# Patient Record
Sex: FEMALE | Race: WHITE | Employment: PART TIME | URBAN - METROPOLITAN AREA
[De-identification: names, ages, dates, MRNs, and addresses within clinical notes are randomized per-mention and may not be internally consistent; named-entity substitution may affect disease eponyms.]

---
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
## 2024-07-05 ENCOUNTER — HOSPITAL ENCOUNTER (INPATIENT)
Age: 19
LOS: 5 days | Discharge: STILL ADMITTED OR ADMINISTRATIVE | End: 2024-07-10
Attending: PSYCHIATRY & NEUROLOGY | Admitting: PSYCHIATRY & NEUROLOGY

## 2024-07-05 ENCOUNTER — HOSPITAL ENCOUNTER (EMERGENCY)
Age: 19
Discharge: PSYCHIATRIC OR SUBSTANCE USE FACILITY | End: 2024-07-05
Attending: EMERGENCY MEDICINE

## 2024-07-05 VITALS
HEIGHT: 63 IN | WEIGHT: 119.27 LBS | TEMPERATURE: 98.3 F | BODY MASS INDEX: 21.13 KG/M2 | HEART RATE: 80 BPM | RESPIRATION RATE: 16 BRPM | OXYGEN SATURATION: 98 % | DIASTOLIC BLOOD PRESSURE: 68 MMHG | SYSTOLIC BLOOD PRESSURE: 97 MMHG

## 2024-07-05 DIAGNOSIS — F32.A DEPRESSION WITH SUICIDAL IDEATION: Primary | ICD-10-CM

## 2024-07-05 DIAGNOSIS — R45.851 DEPRESSION WITH SUICIDAL IDEATION: Primary | ICD-10-CM

## 2024-07-05 LAB
AMPHETAMINES UR QL SCN>1000 NG/ML: NORMAL
ANION GAP SERPL CALCULATED.3IONS-SCNC: 12 MMOL/L (ref 3–16)
APAP SERPL-MCNC: <5 UG/ML (ref 10–30)
BACTERIA URNS QL MICRO: ABNORMAL /HPF
BARBITURATES UR QL SCN>200 NG/ML: NORMAL
BASOPHILS # BLD: 0 K/UL (ref 0–0.2)
BASOPHILS NFR BLD: 0.3 %
BENZODIAZ UR QL SCN>200 NG/ML: NORMAL
BILIRUB UR QL STRIP.AUTO: NEGATIVE
BUN SERPL-MCNC: 11 MG/DL (ref 7–20)
CALCIUM SERPL-MCNC: 9.4 MG/DL (ref 8.3–10.6)
CANNABINOIDS UR QL SCN>50 NG/ML: NORMAL
CHLORIDE SERPL-SCNC: 108 MMOL/L (ref 99–110)
CLARITY UR: ABNORMAL
CO2 SERPL-SCNC: 23 MMOL/L (ref 21–32)
COCAINE UR QL SCN: NORMAL
COLOR UR: ABNORMAL
CREAT SERPL-MCNC: 0.5 MG/DL (ref 0.6–1.1)
DEPRECATED RDW RBC AUTO: 13.2 % (ref 12.4–15.4)
DRUG SCREEN COMMENT UR-IMP: NORMAL
EOSINOPHIL # BLD: 0.1 K/UL (ref 0–0.6)
EOSINOPHIL NFR BLD: 1.4 %
EPI CELLS #/AREA URNS AUTO: 3 /HPF (ref 0–5)
ETHANOLAMINE SERPL-MCNC: NORMAL MG/DL (ref 0–0.08)
FENTANYL SCREEN, URINE: NORMAL
GFR SERPLBLD CREATININE-BSD FMLA CKD-EPI: >90 ML/MIN/{1.73_M2}
GLUCOSE SERPL-MCNC: 87 MG/DL (ref 70–99)
GLUCOSE UR STRIP.AUTO-MCNC: NEGATIVE MG/DL
HCG UR QL: NEGATIVE
HCT VFR BLD AUTO: 40.8 % (ref 36–48)
HGB BLD-MCNC: 14 G/DL (ref 12–16)
HGB UR QL STRIP.AUTO: ABNORMAL
HYALINE CASTS #/AREA URNS AUTO: 0 /LPF (ref 0–8)
KETONES UR STRIP.AUTO-MCNC: ABNORMAL MG/DL
LEUKOCYTE ESTERASE UR QL STRIP.AUTO: ABNORMAL
LYMPHOCYTES # BLD: 2.2 K/UL (ref 1–5.1)
LYMPHOCYTES NFR BLD: 39.9 %
MCH RBC QN AUTO: 30.4 PG (ref 26–34)
MCHC RBC AUTO-ENTMCNC: 34.4 G/DL (ref 31–36)
MCV RBC AUTO: 88.4 FL (ref 80–100)
METHADONE UR QL SCN>300 NG/ML: NORMAL
MONOCYTES # BLD: 0.6 K/UL (ref 0–1.3)
MONOCYTES NFR BLD: 10.2 %
NEUTROPHILS # BLD: 2.6 K/UL (ref 1.7–7.7)
NEUTROPHILS NFR BLD: 48.2 %
NITRITE UR QL STRIP.AUTO: NEGATIVE
OPIATES UR QL SCN>300 NG/ML: NORMAL
OXYCODONE UR QL SCN: NORMAL
PCP UR QL SCN>25 NG/ML: NORMAL
PH UR STRIP.AUTO: 6.5 [PH] (ref 5–8)
PH UR STRIP: 6.5 [PH]
PLATELET # BLD AUTO: 240 K/UL (ref 135–450)
PMV BLD AUTO: 9.1 FL (ref 5–10.5)
POTASSIUM SERPL-SCNC: 4 MMOL/L (ref 3.5–5.1)
PROT UR STRIP.AUTO-MCNC: ABNORMAL MG/DL
RBC # BLD AUTO: 4.61 M/UL (ref 4–5.2)
RBC CLUMPS #/AREA URNS AUTO: 892 /HPF (ref 0–4)
SALICYLATES SERPL-MCNC: <0.3 MG/DL (ref 15–30)
SARS-COV-2 RDRP RESP QL NAA+PROBE: NOT DETECTED
SODIUM SERPL-SCNC: 143 MMOL/L (ref 136–145)
SP GR UR STRIP.AUTO: 1.02 (ref 1–1.03)
UA COMPLETE W REFLEX CULTURE PNL UR: ABNORMAL
UA DIPSTICK W REFLEX MICRO PNL UR: YES
URN SPEC COLLECT METH UR: ABNORMAL
UROBILINOGEN UR STRIP-ACNC: 1 E.U./DL
WBC # BLD AUTO: 5.5 K/UL (ref 4–11)
WBC #/AREA URNS AUTO: 2 /HPF (ref 0–5)

## 2024-07-05 PROCEDURE — 80143 DRUG ASSAY ACETAMINOPHEN: CPT

## 2024-07-05 PROCEDURE — 84703 CHORIONIC GONADOTROPIN ASSAY: CPT

## 2024-07-05 PROCEDURE — 87635 SARS-COV-2 COVID-19 AMP PRB: CPT

## 2024-07-05 PROCEDURE — 85025 COMPLETE CBC W/AUTO DIFF WBC: CPT

## 2024-07-05 PROCEDURE — 1240000000 HC EMOTIONAL WELLNESS R&B

## 2024-07-05 PROCEDURE — 80048 BASIC METABOLIC PNL TOTAL CA: CPT

## 2024-07-05 PROCEDURE — 80179 DRUG ASSAY SALICYLATE: CPT

## 2024-07-05 PROCEDURE — 81001 URINALYSIS AUTO W/SCOPE: CPT

## 2024-07-05 PROCEDURE — 80307 DRUG TEST PRSMV CHEM ANLYZR: CPT

## 2024-07-05 PROCEDURE — 99285 EMERGENCY DEPT VISIT HI MDM: CPT

## 2024-07-05 PROCEDURE — 82077 ASSAY SPEC XCP UR&BREATH IA: CPT

## 2024-07-05 RX ORDER — HYDROXYZINE 50 MG/1
50 TABLET, FILM COATED ORAL 3 TIMES DAILY PRN
Status: DISCONTINUED | OUTPATIENT
Start: 2024-07-05 | End: 2024-07-10 | Stop reason: HOSPADM

## 2024-07-05 RX ORDER — POLYETHYLENE GLYCOL 3350 17 G
2 POWDER IN PACKET (EA) ORAL
Status: DISCONTINUED | OUTPATIENT
Start: 2024-07-05 | End: 2024-07-10 | Stop reason: HOSPADM

## 2024-07-05 RX ORDER — OLANZAPINE 5 MG/1
5 TABLET ORAL 2 TIMES DAILY PRN
Status: DISCONTINUED | OUTPATIENT
Start: 2024-07-05 | End: 2024-07-06

## 2024-07-05 RX ORDER — TRAZODONE HYDROCHLORIDE 50 MG/1
50 TABLET ORAL NIGHTLY PRN
Status: DISCONTINUED | OUTPATIENT
Start: 2024-07-05 | End: 2024-07-10 | Stop reason: HOSPADM

## 2024-07-05 RX ORDER — ACETAMINOPHEN 325 MG/1
650 TABLET ORAL EVERY 8 HOURS PRN
Status: DISCONTINUED | OUTPATIENT
Start: 2024-07-05 | End: 2024-07-10 | Stop reason: HOSPADM

## 2024-07-05 ASSESSMENT — ENCOUNTER SYMPTOMS
VOMITING: 0
SHORTNESS OF BREATH: 0
ABDOMINAL PAIN: 0
SORE THROAT: 0
COUGH: 0
NAUSEA: 0
CHEST TIGHTNESS: 0
BACK PAIN: 0
VOICE CHANGE: 0
WHEEZING: 0

## 2024-07-05 ASSESSMENT — PATIENT HEALTH QUESTIONNAIRE - PHQ9
7. TROUBLE CONCENTRATING ON THINGS, SUCH AS READING THE NEWSPAPER OR WATCHING TELEVISION: NEARLY EVERY DAY
8. MOVING OR SPEAKING SO SLOWLY THAT OTHER PEOPLE COULD HAVE NOTICED. OR THE OPPOSITE, BEING SO FIGETY OR RESTLESS THAT YOU HAVE BEEN MOVING AROUND A LOT MORE THAN USUAL: NEARLY EVERY DAY
4. FEELING TIRED OR HAVING LITTLE ENERGY: NEARLY EVERY DAY
SUM OF ALL RESPONSES TO PHQ QUESTIONS 1-9: 27
1. LITTLE INTEREST OR PLEASURE IN DOING THINGS: NEARLY EVERY DAY
3. TROUBLE FALLING OR STAYING ASLEEP: NEARLY EVERY DAY
SUM OF ALL RESPONSES TO PHQ QUESTIONS 1-9: 24
5. POOR APPETITE OR OVEREATING: NEARLY EVERY DAY
2. FEELING DOWN, DEPRESSED OR HOPELESS: NEARLY EVERY DAY
SUM OF ALL RESPONSES TO PHQ QUESTIONS 1-9: 27
10. IF YOU CHECKED OFF ANY PROBLEMS, HOW DIFFICULT HAVE THESE PROBLEMS MADE IT FOR YOU TO DO YOUR WORK, TAKE CARE OF THINGS AT HOME, OR GET ALONG WITH OTHER PEOPLE: EXTREMELY DIFFICULT
SUM OF ALL RESPONSES TO PHQ QUESTIONS 1-9: 27
SUM OF ALL RESPONSES TO PHQ9 QUESTIONS 1 & 2: 6
9. THOUGHTS THAT YOU WOULD BE BETTER OFF DEAD, OR OF HURTING YOURSELF: NEARLY EVERY DAY
6. FEELING BAD ABOUT YOURSELF - OR THAT YOU ARE A FAILURE OR HAVE LET YOURSELF OR YOUR FAMILY DOWN: NEARLY EVERY DAY

## 2024-07-05 ASSESSMENT — LIFESTYLE VARIABLES
HOW MANY STANDARD DRINKS CONTAINING ALCOHOL DO YOU HAVE ON A TYPICAL DAY: PATIENT DOES NOT DRINK
HOW OFTEN DO YOU HAVE A DRINK CONTAINING ALCOHOL: NEVER
HOW MANY STANDARD DRINKS CONTAINING ALCOHOL DO YOU HAVE ON A TYPICAL DAY: PATIENT DOES NOT DRINK
HOW OFTEN DO YOU HAVE A DRINK CONTAINING ALCOHOL: NEVER

## 2024-07-05 ASSESSMENT — PAIN DESCRIPTION - LOCATION: LOCATION: HEAD

## 2024-07-05 ASSESSMENT — PAIN SCALES - GENERAL
PAINLEVEL_OUTOF10: 3
PAINLEVEL_OUTOF10: 0

## 2024-07-05 ASSESSMENT — SLEEP AND FATIGUE QUESTIONNAIRES
AVERAGE NUMBER OF SLEEP HOURS: 4
DO YOU HAVE DIFFICULTY SLEEPING: YES
DO YOU USE A SLEEP AID: NO
SLEEP PATTERN: DIFFICULTY FALLING ASLEEP;DISTURBED/INTERRUPTED SLEEP;NIGHTMARES/TERRORS

## 2024-07-05 ASSESSMENT — PAIN DESCRIPTION - PAIN TYPE: TYPE: ACUTE PAIN

## 2024-07-05 ASSESSMENT — PAIN - FUNCTIONAL ASSESSMENT
PAIN_FUNCTIONAL_ASSESSMENT: ACTIVITIES ARE NOT PREVENTED
PAIN_FUNCTIONAL_ASSESSMENT: 0-10

## 2024-07-05 ASSESSMENT — PAIN DESCRIPTION - DESCRIPTORS: DESCRIPTORS: ACHING

## 2024-07-05 NOTE — PROGRESS NOTES
4 Eyes Skin Assessment     NAME:  Alisson Lee  YOB: 2005  MEDICAL RECORD NUMBER:  [id number]    The patient is being assessed for  Admission    I agree that at least one RN has performed a thorough Head to Toe Skin Assessment on the patient. ALL assessment sites listed below have been assessed.      Areas assessed by both nurses:    Head, Face, Ears, Shoulders, Back, Chest, Arms, Elbows, Hands, Sacrum. Buttock, Coccyx, Ischium, and Legs. Feet and Heels        Pt had scattered BUE superficial abrasions noted- dry  no drainage noted    Does the Patient have a Wound? No noted wound(s)       Tigre Prevention initiated by RN: No  Wound Care Orders initiated by RN: No    Pressure Injury (Stage 3,4, Unstageable, DTI, NWPT, and Complex wounds) if present, place Wound referral order by RN under : No    New Ostomies, if present place, Ostomy referral order under : No     Nurse 1 eSignature: {Esignature:[id number]}    **SHARE this note so that the co-signing nurse can place an eSignature**    Nurse 2 eSignature: Electronically signed by Nj Capone RN on 7/5/24 at 6:02 PM EDT

## 2024-07-05 NOTE — PROGRESS NOTES
CSSR-S Assessment completed with patient who then scored HIGH RISK.     Provider Dr. Cho was notified of HIGH RISK score, via Telephone at 1712.      Were suicide precautions ordered: YES, per protocol then d/c    If not ordered, justification as follows: Pt is calm and cooperative denies current SI plan or intentions and agrees to communicate with staff if no longer feel safe or in control.       Completed by: Teresa Alvarado RN

## 2024-07-05 NOTE — ED PROVIDER NOTES
[facility] EMERGENCY DEPARTMENT  EMERGENCY DEPARTMENT ENCOUNTER        Pt Name: Alisson Lee  MRN: [id number]  Birthdate 2005  Date of evaluation: 7/5/2024  Provider: ESTEBAN Puckett - EVY  PCP: Alice Robert MD  Note Started: 12:32 PM EDT 7/5/24       I have seen and evaluated this patient with my supervising physician Samia Alvarez MD.      CHIEF COMPLAINT       Chief Complaint   Patient presents with    Suicidal     Feelings of wanting to hurt herself.  Has cut in the past and thoughts of dong it now.  Is currently not on meds due to side effects.  Has been admitted in the past to [facility], Does not want to go there but will go somewhere else for treatment.         HISTORY OF PRESENT ILLNESS: 1 or more Elements     History From: Patient  Limitations to history : None    Alisson Lee is a 19 y.o. female who presents to the emergency department today reporting worsening depression and suicidal thoughts.  Patient states that she has a history of cutting and states that she used a kitchen knife this morning to Both arms.  Patient is currently not on any psychiatric medications.  Patient has been admitted to inpatient psych in the past, states that she is agreeable to admission but does not want to go to [facility].  Patient denies excessive alcohol consumption.  She denies illicit drug use.  She denies visual or auditory hallucinations.  Patient states that she has trouble sleeping.  She denies overdosing.  Patient states \"I just do not want to live anymore\".    Nursing Notes were all reviewed and agreed with or any disagreements were addressed in the HPI.    REVIEW OF SYSTEMS :      Review of Systems   Constitutional:  Positive for appetite change. Negative for chills, fatigue and fever.   HENT:  Negative for congestion, sore throat and voice change.    Eyes:  Negative for visual disturbance.   Respiratory:  Negative for cough, chest tightness, shortness  depression and suicidal thoughts.  Patient states that she has a history of cutting and states that she used a kitchen knife this morning to Both arms.  Patient is currently not on any psychiatric medications.  Patient has been admitted to inpatient psych in the past, states that she is agreeable to admission but does not want to go to [facility].  Patient denies excessive alcohol consumption.  She denies illicit drug use.  She denies visual or auditory hallucinations.  Patient states that she has trouble sleeping.  She denies overdosing.  Patient states \"I just do not want to live anymore\".  Physical exam complete.  Patient arrives nontoxic, afebrile, normotensive.  No signs or symptoms of acute distress noted.    Patient placed on psychiatric hold.  Laboratory studies pending.  Patient awaiting telepsych consult.    Laboratory studies have been unremarkable.  No significant electrolyte derangement or VINNIE.  No leukocytosis.  No significant anemia.  Drug screen is negative.  EtOH level is negative.  Salicylate and acetaminophen levels are also negative.    Inpatient psychiatric admission recommended per telepsych .    At this time there is no evidence of any life-threatening or emergent conditions requiring immediate intervention.    Patient is medically cleared and awaiting inpatient psych placement.  Patient is in agreement with this plan of care.    This case was discussed with ED attending Dr. Alvarez who also performed face-to-face evaluation and is in agreement with this plan of care.      I am the Primary Clinician of Record.  FINAL IMPRESSION      1. Depression with suicidal ideation          DISPOSITION/PLAN     DISPOSITION [facility] 07/05/2024 12:00:51 PM      PATIENT REFERRED TO:  No follow-up provider specified.    DISCHARGE MEDICATIONS:  New Prescriptions    No medications on file       DISCONTINUED MEDICATIONS:  Discontinued Medications    No medications on file

## 2024-07-05 NOTE — ED TRIAGE NOTES
Feelings of wanting to hurt herself.  Has cut in the past and thoughts of dong it now.  Is currently not on meds due to side effects.  Has been admitted in the past to [facility], Does not want to go there but will go somewhere else for treatment.

## 2024-07-05 NOTE — ED PROVIDER NOTES
ED Attending Attestation Note    This patient was seen by the advanced practice provider.     I personally saw the patient. Management plan and care decisions have been discussed with me and I made/approved the management plan and take responsibility for patient management.     Briefly, 19 y.o. female presents with history of depression, ADHD, anxiety, prior admission for suicidal ideation presenting with increasing depression symptoms and 3 days of suicidal ideation.  Does not have definitive plan yet.  Does not have access to firearms.  Denies ingestion or substance issues.  Denies hallucinations or homicidal ideation.  Has several cuts on her forearms    Focused exam:   Gen: 19 y.o. female, NAD, nontoxic appearing  HEENT: NCAT. MMM, PERRL. EOMI.   CV: RRR w/o MRG  Vascular: intact and symmetric radial and DP pulses bilaterally  Lungs: CTAB. No incr WOB.   Abdomen: Soft, nontender, nondistended. No rebound/guarding.   Neuro: awake and alert, speech clear w/o aphasia; intact and symmetric strength and sensation in all 4 extremities, CN 2-12 intact bilaterally  Skin: Several superficial cuts on bilateral forearms  Psych: Flat affect, guarded    MDM:   This is a 19-year-old female with history of depression and anxiety presenting with suicidal ideation.  She does not have any active medical concerns.  She is appropriate for psychiatry admission at this time.  Wounds will be cleaned and tetanus will be updated.  No need for laceration repair.  UDS, salicylate ethanol, acetaminophen CBC, BMP unremarkable.  COVID-negative, hCG negative.    Telepsych consulted and inpatient psychiatry admission recommended.    Nursing notes, vital signs reviewed.     For further details of the patient's emergency department visit, please see the advanced practice provider's documentation.    Samia Alvarez MD     This report has been produced using speech recognition software and may contain errors related to that system including

## 2024-07-05 NOTE — ED NOTES
Registration called this RN stating brothkim is at their desk, requesting to come back. This RN asked patient if brother Kvng was allowed to visit, patient stated no. Registration informed. Charge RN John sharif.

## 2024-07-05 NOTE — PROGRESS NOTES
4 Eyes Skin Assessment     NAME:  Alisson Lee  YOB: 2005  MEDICAL RECORD NUMBER:  [id number]    The patient is being assessed for  Admission    I agree that at least one RN has performed a thorough Head to Toe Skin Assessment on the patient. ALL assessment sites listed below have been assessed.      Areas assessed by both nurses:    Head, Face, Ears, Shoulders, Back, Chest, Arms, Elbows, Hands, Sacrum. Buttock, Coccyx, Ischium, and Legs. Feet and Heels  PT. Noted to have multiple lacerations to BUE. No drainage, no bandaging needed.        Does the Patient have a Wound? No noted wound(s)       Tigre Prevention initiated by RN: No  Wound Care Orders initiated by RN: No    Pressure Injury (Stage 3,4, Unstageable, DTI, NWPT, and Complex wounds) if present, place Wound referral order by RN under : No    New Ostomies, if present place, Ostomy referral order under : No     Nurse 1 eSignature: Electronically signed by Teresa Alvarado RN on 7/5/24 at 6:03 PM EDT    **SHARE this note so that the co-signing nurse can place an eSignature**    Nurse 2 eSignature: Electronically signed by Nj Capone RN on 7/5/24 at 6:04 PM EDT
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
## 2024-07-05 NOTE — ED NOTES
Patient removed all clothes, shoes, keys, pants, socks ,flannel shirt and phone placed in bag and placed in locker #1. Patient placed in gown. Hunter EDT now at bedside for constant observer.

## 2024-07-05 NOTE — PROGRESS NOTES
Pt arrived on unit at 1645 she is calm cooperative. Passes through security check. 1:1 initiated for suicide precautions. Snack and drink provided. Pt denies current SI and agrees to communicate with staff if no longer feel safe or in control. V.s.s.

## 2024-07-06 PROBLEM — F33.2 SEVERE EPISODE OF RECURRENT MAJOR DEPRESSIVE DISORDER, WITHOUT PSYCHOTIC FEATURES (HCC): Status: ACTIVE | Noted: 2024-07-05

## 2024-07-06 PROCEDURE — 99221 1ST HOSP IP/OBS SF/LOW 40: CPT

## 2024-07-06 PROCEDURE — 6370000000 HC RX 637 (ALT 250 FOR IP): Performed by: PSYCHIATRY & NEUROLOGY

## 2024-07-06 PROCEDURE — 1240000000 HC EMOTIONAL WELLNESS R&B

## 2024-07-06 RX ORDER — ESCITALOPRAM OXALATE 10 MG/1
5 TABLET ORAL DAILY
Status: COMPLETED | OUTPATIENT
Start: 2024-07-06 | End: 2024-07-06

## 2024-07-06 RX ORDER — ESCITALOPRAM OXALATE 10 MG/1
10 TABLET ORAL DAILY
Status: DISCONTINUED | OUTPATIENT
Start: 2024-07-07 | End: 2024-07-10 | Stop reason: HOSPADM

## 2024-07-06 RX ADMIN — ESCITALOPRAM OXALATE 5 MG: 10 TABLET ORAL at 16:45

## 2024-07-06 RX ADMIN — TRAZODONE HYDROCHLORIDE 50 MG: 50 TABLET ORAL at 20:54

## 2024-07-06 RX ADMIN — ACETAMINOPHEN 650 MG: 325 TABLET ORAL at 16:45

## 2024-07-06 ASSESSMENT — PATIENT HEALTH QUESTIONNAIRE - PHQ9
SUM OF ALL RESPONSES TO PHQ9 QUESTIONS 1 & 2: 6
8. MOVING OR SPEAKING SO SLOWLY THAT OTHER PEOPLE COULD HAVE NOTICED. OR THE OPPOSITE, BEING SO FIGETY OR RESTLESS THAT YOU HAVE BEEN MOVING AROUND A LOT MORE THAN USUAL: NEARLY EVERY DAY
2. FEELING DOWN, DEPRESSED OR HOPELESS: NEARLY EVERY DAY
SUM OF ALL RESPONSES TO PHQ QUESTIONS 1-9: 27
9. THOUGHTS THAT YOU WOULD BE BETTER OFF DEAD, OR OF HURTING YOURSELF: NEARLY EVERY DAY
3. TROUBLE FALLING OR STAYING ASLEEP: NEARLY EVERY DAY
7. TROUBLE CONCENTRATING ON THINGS, SUCH AS READING THE NEWSPAPER OR WATCHING TELEVISION: NEARLY EVERY DAY
1. LITTLE INTEREST OR PLEASURE IN DOING THINGS: NEARLY EVERY DAY
5. POOR APPETITE OR OVEREATING: NEARLY EVERY DAY
SUM OF ALL RESPONSES TO PHQ QUESTIONS 1-9: 27
4. FEELING TIRED OR HAVING LITTLE ENERGY: NEARLY EVERY DAY
10. IF YOU CHECKED OFF ANY PROBLEMS, HOW DIFFICULT HAVE THESE PROBLEMS MADE IT FOR YOU TO DO YOUR WORK, TAKE CARE OF THINGS AT HOME, OR GET ALONG WITH OTHER PEOPLE: EXTREMELY DIFFICULT
SUM OF ALL RESPONSES TO PHQ QUESTIONS 1-9: 24
6. FEELING BAD ABOUT YOURSELF - OR THAT YOU ARE A FAILURE OR HAVE LET YOURSELF OR YOUR FAMILY DOWN: NEARLY EVERY DAY
SUM OF ALL RESPONSES TO PHQ QUESTIONS 1-9: 27

## 2024-07-06 ASSESSMENT — LIFESTYLE VARIABLES
HOW MANY STANDARD DRINKS CONTAINING ALCOHOL DO YOU HAVE ON A TYPICAL DAY: PATIENT DOES NOT DRINK
HOW OFTEN DO YOU HAVE A DRINK CONTAINING ALCOHOL: NEVER

## 2024-07-06 ASSESSMENT — SLEEP AND FATIGUE QUESTIONNAIRES
SLEEP PATTERN: DIFFICULTY FALLING ASLEEP;NIGHTMARES/TERRORS;INSOMNIA
AVERAGE NUMBER OF SLEEP HOURS: 4
DO YOU USE A SLEEP AID: NO
DO YOU HAVE DIFFICULTY SLEEPING: YES

## 2024-07-06 ASSESSMENT — PAIN SCALES - GENERAL: PAINLEVEL_OUTOF10: 0

## 2024-07-06 ASSESSMENT — PAIN DESCRIPTION - LOCATION: LOCATION: HEAD

## 2024-07-06 NOTE — H&P
Hospital Medicine History & Physical      PCP: Alice Robert MD    Date of Admission: 7/5/2024    Date of Service: Pt seen/examined on 7/6/2024     Chief Complaint:  No chief complaint on file.        History Of Present Illness:      The patient is a 19 y.o. female with pmhx of migraines, depression who presented to [facility] for worsening depression and suicidal ideation.  Patient was seen and evaluated in the ED by the ED medical provider, patient was medically cleared for admission to [facility] at [facility].  This note serves as an admission medical H&P.    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 No pertinent surgical history.    Medications Prior to Admission:    Prior to Admission medications    Medication Sig Start Date End Date Taking? Authorizing Provider   amitriptyline (ELAVIL) 25 MG tablet Take 37.5 mg by mouth nightly  Patient not taking: Reported on 7/5/2024    Prosper Sanchez MD   FLUoxetine HCl (PROZAC PO) Take by mouth  Patient not taking: Reported on 7/5/2024    Prosper Sanchez MD   ondansetron (ZOFRAN ODT) 4 MG disintegrating tablet Take 1 tablet by mouth every 8 hours as needed for Nausea  Patient not taking: Reported on 7/5/2024 6/3/17   Gibran Tee DO       Allergies:  Patient has no known allergies.    Social History:      TOBACCO:   reports that she has never smoked. She has been exposed to tobacco smoke. She has never used smokeless tobacco.  ETOH:   reports no history of alcohol use.      Family History:   Positive as follows:    History reviewed. No pertinent family history.    REVIEW OF SYSTEMS:       Constitutional: Negative for fever   HENT: Negative for sore throat   Eyes: Negative

## 2024-07-06 NOTE — PLAN OF CARE
Alisson has been withdrawn to her room this shift. Patient denies current SI/HI and A/VH. Alisson presents a flat affect and sad mood. Patient reports she feels safe here and will communicate with staff if she feels unsafe or has any thoughts to self harm.   Problem: Depression  Goal: Will be euthymic at discharge  Description: INTERVENTIONS:  1. Administer medication as ordered  2. Provide emotional support via 1:1 interaction with staff  3. Encourage involvement in milieu/groups/activities  4. Monitor for social isolation  Outcome: Progressing     Problem: Self Harm/Suicidality  Goal: Will have no self-injury during hospital stay  Description: INTERVENTIONS:  1.  Ensure constant observer at bedside with Q15M safety checks  2.  Maintain a safe environment  3.  Secure patient belongings  4.  Ensure family/visitors adhere to safety recommendations  5.  Ensure safety tray has been added to patient's diet order  6.  Every shift and PRN: Re-assess suicidal risk via Frequent Screener    7/5/2024 2048 by Lizzie Duque RN  Outcome: Progressing  Flowsheets (Taken 7/5/2024 1719 by Teresa Alvarado RN)  Will have no self-injury during hospital stay:   Ensure constant observer at bedside with Q15M safety checks   Maintain a safe environment
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
## 2024-07-06 NOTE — GROUP NOTE
Group Therapy Note    Date: 7/6/2024    Group Start Time: 1320  Group End Time: 1400  Group Topic: Cognitive Skills    [facility]    Tammy Cruz LISW        Group Therapy Note    Attendees: 2       Patient's Goal:  Patient will understand the difference between reacting vs. Responding and how this affects symptoms of anxiety.     Notes:  patients were provided with information about responding vs reacting to stressful situations and how to utilize mindfulness skills to respond in a calmer manner.    Status After Intervention:  Improved    Participation Level: Active Listener and Minimal    Participation Quality: Attentive      Speech:  hesitant      Thought Process/Content: Linear      Affective Functioning: Congruent      Mood: anxious and depressed      Level of consciousness:  Alert, Oriented x4, and Attentive      Response to Learning: Able to verbalize current knowledge/experience and Progressing to goal      Endings: None Reported    Modes of Intervention: Education      Discipline Responsible: /Counselor      Signature:  SANDRA Puente

## 2024-07-06 NOTE — H&P
[address] [address]                           HISTORY & PHYSICAL      PATIENT NAME: NICOLETTE FARAH            : 2005  MED REC NO: [id number]                      ROOM: [id number]  ACCOUNT NO: [id number]                       ADMIT DATE: 2024  PROVIDER: Severiano Cho MD      REFERRING PHYSICIAN:  CHERRIE LAI    IDENTIFICATION:  This is a domiciled, never , and employed 19-year-old with a self-reported history of depression and ADD, whose friend brought her to [facility] with worsening symptoms of depression and thoughts of suicide.    SOURCES OF INFORMATION:  Patient.  Focused record review.    CHIEF COMPLAINT:  \"I was going to attempt suicide.\"    HISTORY OF PRESENT ILLNESS:  The patient reports she last felt okay about 3 months ago.  Since then, she has developed depression.  She describes worsening low mood, anhedonia, fatigue, poor concentration, amotivation, insomnia, decreased appetite, and 3-4 weeks of suicidal ideation.  She began thinking of ways she might do it but did not carry it out because of friends.  She is agnostic about the afterlife.    She reports feeling safe here and is interested in treatment.    She presents depressed and slowed.  She does not make eye contact.    PSYCHIATRIC REVIEW OF SYSTEMS:  No judah or psychosis.    STRESSORS:  \"I don't know.\"    PAST PSYCHIATRIC HISTORY:  One previous hospitalization - [facility] about 1 year ago.  No other hospitalizations.  She reports no suicide attempts, but does have a history of self-harm behavior in the form of cutting.  She has worked with a therapist for the last year and a half.  She has been diagnosed with depression and ADD.  Previous medication trials include Prozac, Strattera, Adderall, Elavil, Wellbutrin, and others she cannot remember.  She does not remember any being particularly helpful.    SUBSTANCE  here and willing to approach staff with concerns.    Her ability for abstract thought was limited based on her interpretation of simple proverbs.    Insight and judgment were impaired.    PHYSICAL EXAM:  VITAL SIGNS:  Temperature 97.7, pulse 72, respiratory rate 16, blood pressure 114/63.  GAIT:  Normal.    LABORATORY DATA:  Shows a CMP was within normal limits.  Ethanol level not detectable.  Urine drug screen negative.  Acetaminophen and salicylate levels below threshold.  CBC within normal limits.  UA shows possible infection that she may be on her period.    FORMULATION:  This is a domiciled, never , and employed 19-year-old with history of depression and ADD, whose friend brought her to [facility] Emergency Department with worsening symptoms of depression and thoughts of suicide.  She meets criteria for major depressive episode.  She is at risk and requires inpatient stabilization.    DIAGNOSIS:  Major depressive disorder, recurrent, severe, without psychotic features.    PLAN:    1. Admit to Psychiatry for further evaluation and stabilization.  2. Discussed treatment options at length and agreed to start Lexapro.  Start 5 mg today and increase to 10 mg tomorrow.  Order q.15-minute checks for safety, programming, and p.r.n. medication for anxiety, agitation, insomnia.  3. Hospitalist consult for admission.  4. Collateral information if available for diagnostic clarification and care coordination.  5. Estimated length of stay 5-8 days.  She was transferred on a Statement of Belief but agrees to stay voluntarily.    75 minutes was spent with the patient completing this evaluation and more than 50% of time was spent completing this evaluation, providing counseling, and planning treatment with the patient.          LAMONTE TIWARI MD      D:  07/06/2024 10:48:17     T:  07/06/2024 13:19:33     SHEELA/ROMI  Job #:  [id number]     Doc#:  [id number]

## 2024-07-06 NOTE — PLAN OF CARE
Problem: Self Harm/Suicidality  Goal: Will have no self-injury during hospital stay  Description: INTERVENTIONS:  1.  Ensure constant observer at bedside with Q15M safety checks  2.  Maintain a safe environment  3.  Secure patient belongings  4.  Ensure family/visitors adhere to safety recommendations  5.  Ensure safety tray has been added to patient's diet order  6.  Every shift and PRN: Re-assess suicidal risk via Frequent Screener    Outcome: Progressing     Problem: Depression  Goal: Will be euthymic at discharge  Description: INTERVENTIONS:  1. Administer medication as ordered  2. Provide emotional support via 1:1 interaction with staff  3. Encourage involvement in milieu/groups/activities  4. Monitor for social isolation  Outcome: Progressing     Problem: Behavior  Goal: Pt/Family maintain appropriate behavior and adhere to behavioral management agreement, if implemented  Description: INTERVENTIONS:  1. Assess patient/family's coping skills and  non-compliant behavior (including use of illegal substances)  2. Notify security of behavior or suspected illegal substances which indicate the need for search of the family and/or belongings  3. Encourage verbalization of thoughts and concerns in a socially appropriate manner  4. Utilize positive, consistent limit setting strategies supporting safety of patient, staff and others  5. Encourage participation in the decision making process about the behavioral management agreement  6. If a visitor's behavior poses a threat to safety call refer to organization policy.  7. Initiate consult with , Psychosocial CNS, Spiritual Care as appropriate  Outcome: Progressing     Problem: Anxiety  Goal: Will report anxiety at manageable levels  Description: INTERVENTIONS:  1. Administer medication as ordered  2. Teach and rehearse alternative coping skills  3. Provide emotional support with 1:1 interaction with staff  Outcome: Progressing  Flowsheets (Taken 7/6/2024

## 2024-07-06 NOTE — PROGRESS NOTES
Behavioral Services  Medicare Certification Upon Admission    I certify that this patient's inpatient psychiatric hospital admission is medically necessary for:    [x] (1) Treatment which could reasonably be expected to improve this patient's condition,       [x] (2) Or for diagnostic study;     AND     [x](2) The inpatient psychiatric services are provided while the individual is under the care of a physician and are included in the individualized plan of care.    Estimated length of stay/service 5-8 days    Plan for post-hospital care incomplete     Electronically signed by LAMONTE TIWARI MD on 7/6/2024 at 10:49 AM

## 2024-07-07 PROCEDURE — 6370000000 HC RX 637 (ALT 250 FOR IP): Performed by: PSYCHIATRY & NEUROLOGY

## 2024-07-07 PROCEDURE — 1240000000 HC EMOTIONAL WELLNESS R&B

## 2024-07-07 RX ADMIN — ESCITALOPRAM OXALATE 10 MG: 10 TABLET ORAL at 08:53

## 2024-07-07 RX ADMIN — TRAZODONE HYDROCHLORIDE 50 MG: 50 TABLET ORAL at 20:54

## 2024-07-07 ASSESSMENT — PAIN SCALES - GENERAL: PAINLEVEL_OUTOF10: 0

## 2024-07-07 NOTE — PLAN OF CARE
Problem: Self Harm/Suicidality  Goal: Will have no self-injury during hospital stay  Description: INTERVENTIONS:  1.  Ensure constant observer at bedside with Q15M safety checks  2.  Maintain a safe environment  3.  Secure patient belongings  4.  Ensure family/visitors adhere to safety recommendations  5.  Ensure safety tray has been added to patient's diet order  6.  Every shift and PRN: Re-assess suicidal risk via Frequent Screener    7/6/2024 2337 by Lizzie Duque, RN  Outcome: Progressing     Problem: Depression  Goal: Will be euthymic at discharge  Description: INTERVENTIONS:  1. Administer medication as ordered  2. Provide emotional support via 1:1 interaction with staff  3. Encourage involvement in milieu/groups/activities  4. Monitor for social isolation  7/6/2024 2337 by Lizzie Duque, RN  Outcome: Progressing   Alisson has been withdrawn to room this shift. Patient sat at her window and watched the sunset. She stated \"I watch the sunset every night.\" Patient denies current SI/HI and A/VH. Patient presents a flat affect but brightens slightly with interaction. PRN Trazodone requested/given for sleep. Patient requested and was given a journal. Declined snack when offered.

## 2024-07-07 NOTE — PLAN OF CARE
Problem: Self Harm/Suicidality  Goal: Will have no self-injury during hospital stay  Description: INTERVENTIONS:  1.  Ensure constant observer at bedside with Q15M safety checks  2.  Maintain a safe environment  3.  Secure patient belongings  4.  Ensure family/visitors adhere to safety recommendations  5.  Ensure safety tray has been added to patient's diet order  6.  Every shift and PRN: Re-assess suicidal risk via Frequent Screener    Recent Flowsheet Documentation  Taken 7/7/2024 1157 by Ida Lino RN  Will have no self-injury during hospital stay: Maintain a safe environment  7/6/2024 2337 by Lizzie Duque RN  Outcome: Progressing     Problem: Depression  Goal: Will be euthymic at discharge  Description: INTERVENTIONS:  1. Administer medication as ordered  2. Provide emotional support via 1:1 interaction with staff  3. Encourage involvement in milieu/groups/activities  4. Monitor for social isolation  7/6/2024 2337 by Lizzie Duque RN  Outcome: Progressing     Problem: Anxiety  Goal: Will report anxiety at manageable levels  Description: INTERVENTIONS:  1. Administer medication as ordered  2. Teach and rehearse alternative coping skills  3. Provide emotional support with 1:1 interaction with staff  Recent Flowsheet Documentation  Taken 7/7/2024 1157 by Ida Lino RN  Will report anxiety at manageable levels:   Administer medication as ordered   Teach and rehearse alternative coping skills   Provide emotional support with 1:1 interaction with staff

## 2024-07-08 PROCEDURE — 6370000000 HC RX 637 (ALT 250 FOR IP): Performed by: PSYCHIATRY & NEUROLOGY

## 2024-07-08 PROCEDURE — 1240000000 HC EMOTIONAL WELLNESS R&B

## 2024-07-08 RX ADMIN — TRAZODONE HYDROCHLORIDE 50 MG: 50 TABLET ORAL at 21:15

## 2024-07-08 RX ADMIN — ACETAMINOPHEN 650 MG: 325 TABLET ORAL at 11:16

## 2024-07-08 RX ADMIN — HYDROXYZINE HYDROCHLORIDE 50 MG: 50 TABLET, FILM COATED ORAL at 17:48

## 2024-07-08 RX ADMIN — ESCITALOPRAM OXALATE 10 MG: 10 TABLET ORAL at 08:37

## 2024-07-08 ASSESSMENT — PAIN SCALES - GENERAL
PAINLEVEL_OUTOF10: 0
PAINLEVEL_OUTOF10: 6

## 2024-07-08 ASSESSMENT — PAIN DESCRIPTION - LOCATION: LOCATION: HEAD

## 2024-07-08 NOTE — PROGRESS NOTES
Pt isolative to room.  Pt did not wish to speak to this writer throughout shift, selectively mute.  Pt did assure staff that she was safe and did not have thoughts of harming herself.      Staff received a phone call from a woman, Pal.  When the patient was informed of the call, she hunched down in the corner of her room with her face covered.  She declined any needs at this time.  This writer sat with patient; however, she declined to talk.  Pt came to team station about an hour later and received PRN Atarax for anxiety.  She also grabbed her dinner and took it to her room.  She appeared to be eating most of her food.      Much emotional support provided to patient throughout shift. Monitored closely for safety and comfort.

## 2024-07-08 NOTE — PLAN OF CARE
Problem: Self Harm/Suicidality  Goal: Will have no self-injury during hospital stay  Description: INTERVENTIONS:  1.  Ensure constant observer at bedside with Q15M safety checks  2.  Maintain a safe environment  3.  Secure patient belongings  4.  Ensure family/visitors adhere to safety recommendations  5.  Ensure safety tray has been added to patient's diet order  6.  Every shift and PRN: Re-assess suicidal risk via Frequent Screener    Outcome: Progressing     Problem: Depression  Goal: Will be euthymic at discharge  Description: INTERVENTIONS:  1. Administer medication as ordered  2. Provide emotional support via 1:1 interaction with staff  3. Encourage involvement in milieu/groups/activities  4. Monitor for social isolation  Outcome: Progressing     Problem: Behavior  Goal: Pt/Family maintain appropriate behavior and adhere to behavioral management agreement, if implemented  Description: INTERVENTIONS:  1. Assess patient/family's coping skills and  non-compliant behavior (including use of illegal substances)  2. Notify security of behavior or suspected illegal substances which indicate the need for search of the family and/or belongings  3. Encourage verbalization of thoughts and concerns in a socially appropriate manner  4. Utilize positive, consistent limit setting strategies supporting safety of patient, staff and others  5. Encourage participation in the decision making process about the behavioral management agreement  6. If a visitor's behavior poses a threat to safety call refer to organization policy.  7. Initiate consult with , Psychosocial CNS, Spiritual Care as appropriate  Outcome: Progressing     Problem: Anxiety  Goal: Will report anxiety at manageable levels  Description: INTERVENTIONS:  1. Administer medication as ordered  2. Teach and rehearse alternative coping skills  3. Provide emotional support with 1:1 interaction with staff  Outcome: Progressing     Problem: Sleep  Disturbance  Goal: Will exhibit normal sleeping pattern  Description: INTERVENTIONS:  1. Administer medication as ordered  2. Decrease environmental stimuli, including noise, as appropriate  3. Discourage social isolation and naps during the day  Outcome: Progressing   Pt brighter tonight. She is more talkative. She laughed and joked a bit. Pt pleasant and cooperative.  Talking about her journaling that she uses as a coping mechanism. Encouraged groups to develop further coping mechanism. Pt voiced understanding and said that she will start going to groups. Encouraged her to follow her schedule which she had out on her desk.  Discussed the brother she lives with and parents. Pt doesn't seem very close to parents. She doesn't get to see her brother a lot either due to their differing schedules.   Pt seems, overall less anxious.  Pt given Trazodone 50 mg at 2054 which was effective.

## 2024-07-08 NOTE — GROUP NOTE
Group Therapy Note    Date: 7/8/2024    Group Start Time: 1000  Group End Time: 1045  Group Topic: Cognitive Skills    [facility]    Phuong Joyner LPC    Participants engaged in exploring somatic symptoms of anxiety and depression. Participants discussed the \"alphabet of coping skills\" and identified numerous coping skills.     Group Therapy Note    Attendees: 9         Notes:  Alisson was observed to be an active listener throughout the group. She was observed to share one coping skill with the group but was seen to be taking notes and engaged.     Status After Intervention:  Improved    Participation Level: Active Listener and Interactive    Participation Quality: Appropriate and Sharing      Speech:  normal      Thought Process/Content: Logical      Affective Functioning: Congruent      Mood: anxious      Level of consciousness:  Alert and Oriented x4      Response to Learning: Able to verbalize current knowledge/experience      Endings: None Reported    Modes of Intervention: Education, Support, Socialization, and Exploration      Discipline Responsible: /Counselor      Signature:  Phuong Joyner LPC

## 2024-07-08 NOTE — PLAN OF CARE
Problem: Depression  Goal: Will be euthymic at discharge  Description: INTERVENTIONS:  1. Administer medication as ordered  2. Provide emotional support via 1:1 interaction with staff  3. Encourage involvement in milieu/groups/activities  4. Monitor for social isolation  7/8/2024 0923 by Ever Rivas RN (Peters)  Outcome: Not Progressing     Problem: Self Harm/Suicidality  Goal: Will have no self-injury during hospital stay  Description: INTERVENTIONS:  1.  Ensure constant observer at bedside with Q15M safety checks  2.  Maintain a safe environment  3.  Secure patient belongings  4.  Ensure family/visitors adhere to safety recommendations  5.  Ensure safety tray has been added to patient's diet order  6.  Every shift and PRN: Re-assess suicidal risk via Frequent Screener    7/8/2024 0923 by Ever Rivas RN (Peters)  Outcome: Progressing     Problem: Behavior  Goal: Pt/Family maintain appropriate behavior and adhere to behavioral management agreement, if implemented  Description: INTERVENTIONS:  1. Assess patient/family's coping skills and  non-compliant behavior (including use of illegal substances)  2. Notify security of behavior or suspected illegal substances which indicate the need for search of the family and/or belongings  3. Encourage verbalization of thoughts and concerns in a socially appropriate manner  4. Utilize positive, consistent limit setting strategies supporting safety of patient, staff and others  5. Encourage participation in the decision making process about the behavioral management agreement  6. If a visitor's behavior poses a threat to safety call refer to organization policy.  7. Initiate consult with , Psychosocial CNS, Spiritual Care as appropriate  7/8/2024 0923 by Ever Rivas (Peters) RN  Outcome: Progressing

## 2024-07-09 PROCEDURE — 1240000000 HC EMOTIONAL WELLNESS R&B

## 2024-07-09 PROCEDURE — 6370000000 HC RX 637 (ALT 250 FOR IP): Performed by: PSYCHIATRY & NEUROLOGY

## 2024-07-09 RX ADMIN — ESCITALOPRAM OXALATE 10 MG: 10 TABLET ORAL at 12:00

## 2024-07-09 RX ADMIN — TRAZODONE HYDROCHLORIDE 50 MG: 50 TABLET ORAL at 21:32

## 2024-07-09 NOTE — PLAN OF CARE
Problem: Self Harm/Suicidality  Goal: Will have no self-injury during hospital stay  Description: INTERVENTIONS:  1.  Ensure constant observer at bedside with Q15M safety checks  2.  Maintain a safe environment  3.  Secure patient belongings  4.  Ensure family/visitors adhere to safety recommendations  5.  Ensure safety tray has been added to patient's diet order  6.  Every shift and PRN: Re-assess suicidal risk via Frequent Screener    Outcome: Progressing     Problem: Depression  Goal: Will be euthymic at discharge  Description: INTERVENTIONS:  1. Administer medication as ordered  2. Provide emotional support via 1:1 interaction with staff  3. Encourage involvement in milieu/groups/activities  4. Monitor for social isolation  Outcome: Progressing     Problem: Anxiety  Goal: Will report anxiety at manageable levels  Description: INTERVENTIONS:  1. Administer medication as ordered  2. Teach and rehearse alternative coping skills  3. Provide emotional support with 1:1 interaction with staff  Outcome: Progressing      Patient is in the lateral left side position.

## 2024-07-09 NOTE — GROUP NOTE
Group Therapy Note    Date: 7/9/2024    Group Start Time: 1000  Group End Time: 1045  Group Topic: Psychoeducation    [facility]    oDra Marin LISW        Group Therapy Note    Attendees: 7       Patient's Goal:  to learn and discuss the communication styles of being assertive, passive and aggressive and that communicating in an assertive manner is the healthiest way to communicate. Pt's asked to apply to themselves.                                                                    Notes:  pt attended group for the full duration. She participated in group discussion when called upon and was able to apply to herself.    Status After Intervention:  Improved    Participation Level: Active Listener and Minimal    Participation Quality: Appropriate and Attentive      Speech:  hesitant      Thought Process/Content: Logical      Affective Functioning: Congruent      Mood: depressed      Level of consciousness:  Alert, Oriented x4, and Attentive      Response to Learning: Able to verbalize current knowledge/experience, Able to verbalize/acknowledge new learning, and Progressing to goal      Endings: None Reported    Modes of Intervention: Education, Support, Socialization, Exploration, and Clarifying      Discipline Responsible: /Counselor      Signature:  SANDRA Haq

## 2024-07-09 NOTE — PROGRESS NOTES
Pt has been visible on unit at times but withdrawn to self. Currently in room doing puzzle. She reports feeling better overall but still struggling with anxiety that comes and goes and depression/sadness stating they have not improved. Unable to identify a trigger just states she is overwhelmed with life. She does have a therapist she sees weekly and coping skills in place such as journaling, watching TV. Pt denies current SI/HI/VH/AH and agrees to communicate with staff if no longer feel safe or in control. States she will not do anything here but still feels unsafe at home with thoughts of cutting self. Lives with brother and states she does most the chores and pays bills.

## 2024-07-09 NOTE — BH NOTE
Patient alert and oriented x 3. Patient rates Depression 9/10 and Anxiety 6/10. Patient seclusive to her room this evening but did come to wrap up group and participated. Patient denies SI/HI/A/V/H. Patient doesn't have HS medications scheduled. Patient states, \"Anxiety medication helped rates 2/10.\" Patient requesting a sleeoing pill. Patient medicated with Trazodone 50 mg po.Patient denies self harm, but stated if she leaves she will try kill herself by cutting self  with a knife\"

## 2024-07-09 NOTE — PROGRESS NOTES
Department of Psychiatry  Progress Note    Patient's chart was reviewed. Discussed with treatment team. Met with patient.     SUBJECTIVE:      Remains depressed.    No EC; limited engagement; flat affect.    Attending groups and journaling.     Tolerating Lexapro well so far.     ROS:   Patient has new complaints: no  Sleeping adequately:  Yes   Appetite adequate: Yes  Engaged in programming: yes    OBJECTIVE:  VITALS:  /60   Pulse 85   Temp 98.4 °F (36.9 °C) (Oral)   Resp 16   Ht 1.6 m (5' 2.99\")   Wt 54.1 kg (119 lb 4.3 oz)   LMP 07/01/2024 Comment: Currently menstrating  SpO2 98%   BMI 21.13 kg/m²     Mental Status Examination:    Appearance: fair grooming and hygiene  Behavior/Attitude toward examiner:  guarded  Speech: poverty  Mood:  \"I don't know\"  Affect:  flat  Thought processes:  Goal directed, linear, no MARISABEL or gross disorganization  Thought Content: less SI, no HI, no delusions voiced, no obsessions  Perceptions: no AVH  Attention: attention span and concentration were intact to interview   Abstraction: intact  Cognition:  Alert and oriented to person, place, time, and situation, recall intact  Insight: Limited   Judgment: Limited    Medication:  Scheduled:   escitalopram  10 mg Oral Daily        PRN:  acetaminophen, hydrOXYzine HCl, traZODone, nicotine polacrilex     FORMULATION:  This is a domiciled, never , and employed 19-year-old with history of depression and ADD, whose friend brought her to [facility] with worsening symptoms of depression and thoughts of suicide.  She meets criteria for major depressive episode.  She is at risk and requires inpatient stabilization.     Principal Problem:    Severe episode of recurrent major depressive disorder, without psychotic features (HCC)  Resolved Problems:    * No resolved hospital problems. *     PLAN:    1. Admitted to Psychiatry for further evaluation and stabilization.    2. On admission, discussed treatment

## 2024-07-09 NOTE — GROUP NOTE
Group Therapy Note    Date: 7/9/2024    Group Start Time: 1100  Group End Time: 1145  Group Topic: Healthy Living/Wellness    [facility]    Mray Kate Ghosh, RT        Group Therapy Note    Attendees: 8    Wellness group was centered on the importance of personal maintenance and having healthy outlets for stress.  Group discussed how self care and leisure fit into recovery and relapse prevention.      Notes:  Pt was present and attentive across session. Shared input during group discussion and said that spending time with friends helps when feeling down.  Pt interacted appropriately with others and met goal for group.     Status After Intervention:  Improved    Participation Level: Active Listener, Interactive, and Minimal    Participation Quality: Appropriate, Attentive, and Sharing      Speech:  hesitant      Thought Process/Content: Logical      Affective Functioning: Congruent      Mood: depressed      Level of consciousness:  Alert and Attentive      Response to Learning: Able to verbalize current knowledge/experience and Progressing to goal      Endings: None Reported    Modes of Intervention: Education, Support, Socialization, Exploration, Clarifying, and Problem-solving      Discipline Responsible: Psychoeducational Specialist and Recreational Therapist      Signature:  Mary Kate Ghosh MA, CTRS

## 2024-07-10 ENCOUNTER — APPOINTMENT (OUTPATIENT)
Dept: CT IMAGING | Age: 19
End: 2024-07-10
Attending: PSYCHIATRY & NEUROLOGY

## 2024-07-10 ENCOUNTER — HOSPITAL ENCOUNTER (INPATIENT)
Age: 19
LOS: 2 days | Discharge: PSYCHIATRIC OR SUBSTANCE USE FACILITY | End: 2024-07-12
Attending: INTERNAL MEDICINE | Admitting: INTERNAL MEDICINE

## 2024-07-10 VITALS
RESPIRATION RATE: 16 BRPM | DIASTOLIC BLOOD PRESSURE: 45 MMHG | WEIGHT: 119.27 LBS | HEIGHT: 63 IN | HEART RATE: 80 BPM | BODY MASS INDEX: 21.13 KG/M2 | TEMPERATURE: 97.5 F | SYSTOLIC BLOOD PRESSURE: 98 MMHG | OXYGEN SATURATION: 100 %

## 2024-07-10 PROBLEM — F48.8 PSYCHOGENIC SYNCOPE: Status: ACTIVE | Noted: 2024-07-10

## 2024-07-10 PROBLEM — R55 SYNCOPE AND COLLAPSE: Status: ACTIVE | Noted: 2024-07-10

## 2024-07-10 PROBLEM — I95.1 ORTHOSTATIC HYPOTENSION: Status: ACTIVE | Noted: 2024-07-10

## 2024-07-10 LAB
ALBUMIN SERPL-MCNC: 4.1 G/DL (ref 3.4–5)
ALBUMIN/GLOB SERPL: 1.6 {RATIO} (ref 1.1–2.2)
ALP SERPL-CCNC: 64 U/L (ref 40–129)
ALT SERPL-CCNC: 12 U/L (ref 10–40)
ANION GAP SERPL CALCULATED.3IONS-SCNC: 11 MMOL/L (ref 3–16)
AST SERPL-CCNC: 17 U/L (ref 15–37)
BASOPHILS # BLD: 0 K/UL (ref 0–0.2)
BASOPHILS NFR BLD: 0.5 %
BILIRUB SERPL-MCNC: 0.4 MG/DL (ref 0–1)
BUN SERPL-MCNC: 11 MG/DL (ref 7–20)
CALCIUM SERPL-MCNC: 9 MG/DL (ref 8.3–10.6)
CHLORIDE SERPL-SCNC: 104 MMOL/L (ref 99–110)
CO2 SERPL-SCNC: 23 MMOL/L (ref 21–32)
CREAT SERPL-MCNC: 0.6 MG/DL (ref 0.6–1.1)
DEPRECATED RDW RBC AUTO: 13.1 % (ref 12.4–15.4)
EKG ATRIAL RATE: 86 BPM
EKG DIAGNOSIS: NORMAL
EKG P AXIS: 79 DEGREES
EKG P-R INTERVAL: 186 MS
EKG Q-T INTERVAL: 382 MS
EKG QRS DURATION: 76 MS
EKG QTC CALCULATION (BAZETT): 457 MS
EKG R AXIS: 67 DEGREES
EKG T AXIS: 55 DEGREES
EKG VENTRICULAR RATE: 86 BPM
EOSINOPHIL # BLD: 0.2 K/UL (ref 0–0.6)
EOSINOPHIL NFR BLD: 2.7 %
GFR SERPLBLD CREATININE-BSD FMLA CKD-EPI: >90 ML/MIN/{1.73_M2}
GLUCOSE BLD-MCNC: 73 MG/DL (ref 70–99)
GLUCOSE BLD-MCNC: 73 MG/DL (ref 70–99)
GLUCOSE SERPL-MCNC: 89 MG/DL (ref 70–99)
HCT VFR BLD AUTO: 40.4 % (ref 36–48)
HGB BLD-MCNC: 13.4 G/DL (ref 12–16)
LYMPHOCYTES # BLD: 2.5 K/UL (ref 1–5.1)
LYMPHOCYTES NFR BLD: 40.1 %
MCH RBC QN AUTO: 29.8 PG (ref 26–34)
MCHC RBC AUTO-ENTMCNC: 33.2 G/DL (ref 31–36)
MCV RBC AUTO: 89.8 FL (ref 80–100)
MONOCYTES # BLD: 0.5 K/UL (ref 0–1.3)
MONOCYTES NFR BLD: 7.4 %
NEUTROPHILS # BLD: 3 K/UL (ref 1.7–7.7)
NEUTROPHILS NFR BLD: 49.3 %
PERFORMED ON: NORMAL
PERFORMED ON: NORMAL
PLATELET # BLD AUTO: 303 K/UL (ref 135–450)
PMV BLD AUTO: 8.5 FL (ref 5–10.5)
POTASSIUM SERPL-SCNC: 3.7 MMOL/L (ref 3.5–5.1)
PROT SERPL-MCNC: 6.6 G/DL (ref 6.4–8.2)
RBC # BLD AUTO: 4.5 M/UL (ref 4–5.2)
SODIUM SERPL-SCNC: 138 MMOL/L (ref 136–145)
TROPONIN, HIGH SENSITIVITY: <6 NG/L (ref 0–14)
WBC # BLD AUTO: 6.2 K/UL (ref 4–11)

## 2024-07-10 PROCEDURE — 2580000003 HC RX 258: Performed by: NURSE PRACTITIONER

## 2024-07-10 PROCEDURE — 80053 COMPREHEN METABOLIC PANEL: CPT

## 2024-07-10 PROCEDURE — 84484 ASSAY OF TROPONIN QUANT: CPT

## 2024-07-10 PROCEDURE — 6360000002 HC RX W HCPCS: Performed by: INTERNAL MEDICINE

## 2024-07-10 PROCEDURE — 93005 ELECTROCARDIOGRAM TRACING: CPT | Performed by: NURSE PRACTITIONER

## 2024-07-10 PROCEDURE — 6370000000 HC RX 637 (ALT 250 FOR IP): Performed by: PSYCHIATRY & NEUROLOGY

## 2024-07-10 PROCEDURE — 6370000000 HC RX 637 (ALT 250 FOR IP): Performed by: NURSE PRACTITIONER

## 2024-07-10 PROCEDURE — 85025 COMPLETE CBC W/AUTO DIFF WBC: CPT

## 2024-07-10 PROCEDURE — 36415 COLL VENOUS BLD VENIPUNCTURE: CPT

## 2024-07-10 PROCEDURE — 70450 CT HEAD/BRAIN W/O DYE: CPT

## 2024-07-10 PROCEDURE — 99223 1ST HOSP IP/OBS HIGH 75: CPT | Performed by: NURSE PRACTITIONER

## 2024-07-10 PROCEDURE — 2060000000 HC ICU INTERMEDIATE R&B

## 2024-07-10 PROCEDURE — 93010 ELECTROCARDIOGRAM REPORT: CPT | Performed by: INTERNAL MEDICINE

## 2024-07-10 RX ORDER — POTASSIUM CHLORIDE 750 MG/1
40 TABLET, EXTENDED RELEASE ORAL PRN
Status: DISCONTINUED | OUTPATIENT
Start: 2024-07-10 | End: 2024-07-12 | Stop reason: HOSPADM

## 2024-07-10 RX ORDER — SODIUM CHLORIDE 9 MG/ML
INJECTION, SOLUTION INTRAVENOUS CONTINUOUS
Status: DISCONTINUED | OUTPATIENT
Start: 2024-07-10 | End: 2024-07-11

## 2024-07-10 RX ORDER — HYDROXYZINE 50 MG/1
50 TABLET, FILM COATED ORAL 3 TIMES DAILY PRN
Status: DISCONTINUED | OUTPATIENT
Start: 2024-07-10 | End: 2024-07-12 | Stop reason: HOSPADM

## 2024-07-10 RX ORDER — SODIUM CHLORIDE 0.9 % (FLUSH) 0.9 %
5-40 SYRINGE (ML) INJECTION PRN
Status: DISCONTINUED | OUTPATIENT
Start: 2024-07-10 | End: 2024-07-12 | Stop reason: HOSPADM

## 2024-07-10 RX ORDER — SODIUM CHLORIDE 0.9 % (FLUSH) 0.9 %
5-40 SYRINGE (ML) INJECTION EVERY 12 HOURS SCHEDULED
Status: DISCONTINUED | OUTPATIENT
Start: 2024-07-10 | End: 2024-07-12 | Stop reason: HOSPADM

## 2024-07-10 RX ORDER — ESCITALOPRAM OXALATE 10 MG/1
10 TABLET ORAL DAILY
Status: CANCELLED | OUTPATIENT
Start: 2024-07-11

## 2024-07-10 RX ORDER — KETOROLAC TROMETHAMINE 30 MG/ML
30 INJECTION, SOLUTION INTRAMUSCULAR; INTRAVENOUS ONCE
Status: COMPLETED | OUTPATIENT
Start: 2024-07-10 | End: 2024-07-10

## 2024-07-10 RX ORDER — ACETAMINOPHEN 650 MG/1
650 SUPPOSITORY RECTAL EVERY 6 HOURS PRN
Status: DISCONTINUED | OUTPATIENT
Start: 2024-07-10 | End: 2024-07-10 | Stop reason: DRUGHIGH

## 2024-07-10 RX ORDER — ACETAMINOPHEN 325 MG/1
650 TABLET ORAL EVERY 6 HOURS PRN
Status: DISCONTINUED | OUTPATIENT
Start: 2024-07-10 | End: 2024-07-10 | Stop reason: DRUGHIGH

## 2024-07-10 RX ORDER — ENOXAPARIN SODIUM 100 MG/ML
40 INJECTION SUBCUTANEOUS DAILY
Status: DISCONTINUED | OUTPATIENT
Start: 2024-07-10 | End: 2024-07-12 | Stop reason: HOSPADM

## 2024-07-10 RX ORDER — ACETAMINOPHEN 325 MG/1
650 TABLET ORAL EVERY 8 HOURS PRN
Status: DISCONTINUED | OUTPATIENT
Start: 2024-07-10 | End: 2024-07-12 | Stop reason: HOSPADM

## 2024-07-10 RX ORDER — POLYETHYLENE GLYCOL 3350 17 G/17G
17 POWDER, FOR SOLUTION ORAL DAILY PRN
Status: DISCONTINUED | OUTPATIENT
Start: 2024-07-10 | End: 2024-07-12 | Stop reason: HOSPADM

## 2024-07-10 RX ORDER — SODIUM CHLORIDE 9 MG/ML
INJECTION, SOLUTION INTRAVENOUS PRN
Status: DISCONTINUED | OUTPATIENT
Start: 2024-07-10 | End: 2024-07-12 | Stop reason: HOSPADM

## 2024-07-10 RX ORDER — MAGNESIUM SULFATE IN WATER 40 MG/ML
2000 INJECTION, SOLUTION INTRAVENOUS PRN
Status: DISCONTINUED | OUTPATIENT
Start: 2024-07-10 | End: 2024-07-12 | Stop reason: HOSPADM

## 2024-07-10 RX ORDER — ONDANSETRON 2 MG/ML
4 INJECTION INTRAMUSCULAR; INTRAVENOUS EVERY 6 HOURS PRN
Status: DISCONTINUED | OUTPATIENT
Start: 2024-07-10 | End: 2024-07-12 | Stop reason: HOSPADM

## 2024-07-10 RX ORDER — POTASSIUM CHLORIDE 7.45 MG/ML
10 INJECTION INTRAVENOUS PRN
Status: DISCONTINUED | OUTPATIENT
Start: 2024-07-10 | End: 2024-07-12 | Stop reason: HOSPADM

## 2024-07-10 RX ORDER — ONDANSETRON 4 MG/1
4 TABLET, ORALLY DISINTEGRATING ORAL EVERY 8 HOURS PRN
Status: DISCONTINUED | OUTPATIENT
Start: 2024-07-10 | End: 2024-07-12 | Stop reason: HOSPADM

## 2024-07-10 RX ADMIN — KETOROLAC TROMETHAMINE 30 MG: 30 INJECTION, SOLUTION INTRAMUSCULAR; INTRAVENOUS at 20:53

## 2024-07-10 RX ADMIN — Medication 10 ML: at 20:22

## 2024-07-10 RX ADMIN — SODIUM CHLORIDE: 9 INJECTION, SOLUTION INTRAVENOUS at 16:27

## 2024-07-10 RX ADMIN — ESCITALOPRAM OXALATE 10 MG: 10 TABLET ORAL at 11:04

## 2024-07-10 RX ADMIN — ACETAMINOPHEN 650 MG: 325 TABLET ORAL at 14:16

## 2024-07-10 ASSESSMENT — PAIN DESCRIPTION - FREQUENCY: FREQUENCY: CONTINUOUS

## 2024-07-10 ASSESSMENT — PAIN DESCRIPTION - DESCRIPTORS
DESCRIPTORS: ACHING
DESCRIPTORS: ACHING

## 2024-07-10 ASSESSMENT — PAIN - FUNCTIONAL ASSESSMENT
PAIN_FUNCTIONAL_ASSESSMENT: ACTIVITIES ARE NOT PREVENTED
PAIN_FUNCTIONAL_ASSESSMENT: ACTIVITIES ARE NOT PREVENTED

## 2024-07-10 ASSESSMENT — PAIN SCALES - GENERAL
PAINLEVEL_OUTOF10: 8
PAINLEVEL_OUTOF10: 8
PAINLEVEL_OUTOF10: 5
PAINLEVEL_OUTOF10: 8

## 2024-07-10 ASSESSMENT — PAIN DESCRIPTION - LOCATION
LOCATION: HEAD
LOCATION: HEAD

## 2024-07-10 ASSESSMENT — PAIN DESCRIPTION - ORIENTATION
ORIENTATION: MID
ORIENTATION: MID

## 2024-07-10 ASSESSMENT — PAIN DESCRIPTION - ONSET: ONSET: PROGRESSIVE

## 2024-07-10 ASSESSMENT — PAIN DESCRIPTION - PAIN TYPE: TYPE: ACUTE PAIN

## 2024-07-10 NOTE — PROGRESS NOTES
Pt has been withdrawn to room and isolative to self but is attending groups. She had been writing a lot in her journal. She is A&OX4 calm cooperative and pleasant. Pt denies current SI/HI/VH/AH and agrees to communicate with staff if no longer feel safe or in control. She states she feels safe here but still unsure how she will feel at home. She read pages out of her journal to me and states she is not sad but she is depressed and has no motivation to do things, is zoned out, de - energized. Discussed coping skills and mediation and need for multiple aspects of care to help her improve. Pt does brighten some during interaction. She reports improved sleep here with the prn trazodone and improved appetite.

## 2024-07-10 NOTE — H&P
hours as needed for Nausea  Patient not taking: Reported on 7/5/2024 6/3/17   Gibran Tee,        Allergies:  Patient has no known allergies.    Social History:  The patient currently lives home    TOBACCO:   reports that she has never smoked. She has been exposed to tobacco smoke. She has never used smokeless tobacco.  ETOH:   reports no history of alcohol use.      Family History:   Positive as follows:    No family history on file.    REVIEW OF SYSTEMS:       Constitutional: Negative for fever   HENT: Negative for sore throat   Eyes: Negative for redness   Respiratory: Negative  for dyspnea, cough   Cardiovascular: Negative for chest pain   Gastrointestinal: Negative for vomiting, diarrhea   Genitourinary: Negative for hematuria   Musculoskeletal: Negative for arthralgias   Skin: Negative for rash   Neurological: +syncope   Hematological: Negative for adenopathy   Psychiatric/Behavorial: + for anxiety    PHYSICAL EXAM:    /65   Pulse 75   Temp 98.7 °F (37.1 °C) (Oral)   Resp 15   Ht 1.6 m (5' 2.99\")   Wt 54.1 kg (119 lb 4.3 oz)   LMP 07/01/2024 Comment: ended 7/8  SpO2 100%   BMI 21.13 kg/m²     Gen: No distress. Alert. Pale, diaphoretic   Eyes: PERRL. No sclera icterus. No conjunctival injection.   ENT: No discharge. Pharynx clear.   Neck: No JVD.  Trachea midline.  Resp: No accessory muscle use. No crackles. No wheezes. No rhonchi.   CV: Regular rate. Regular rhythm. No murmur.  No rub. No edema.   GI: Non-tender. Non-distended. No masses. No organomegaly. Normal bowel sounds. No hernia.   Skin: Warm and dry. No nodule on exposed extremities. No rash on exposed extremities.   M/S: No cyanosis. No joint deformity. No clubbing.   Neuro: Awake. Grossly nonfocal    ROM to all extremities neurovascularly intact  +generalized weakness  ROM to neck without any pain or discomfort  Psych: Oriented x 3. +anxious     CBC:   Recent Labs     07/10/24  1326   WBC 6.2   HGB 13.4   HCT 40.4   MCV 89.8  enlargementBorderline ECGWhen compared with ECG of 08-OCT-2022 15:29,T wave inversion no longer evident in Inferior leads      RADIOLOGY    No orders to display      CT 7/10/2024  FINDINGS:  BRAIN/VENTRICLES: Mild motion/streak artifact is present on the examination.  The ventricular system is normal in appearance.  No evidence of mass effect  or midline shift.  No significant area of abnormal attenuation of brain  parenchyma is identified.  No abnormal extra-axial fluid collection is  identified.     ORBITS: The visualized portion of the orbits demonstrate no acute abnormality.     SINUSES: The visualized paranasal sinuses and mastoid air cells demonstrate  no acute abnormality.  Frontal sinuses are not aerated on a developmental  basis.  4 mm rounded density in the anterior left maxillary sinus could  represent tiny mucous retention cyst or polyp.  Very minimal left maxillary  mucosal thickening is identified.     SOFT TISSUES/SKULL:  No acute abnormality of the visualized skull or soft  tissues.     IMPRESSION:  No evidence of acute intracranial abnormality.     This result has not been signed. Information might be incomplete.    Active Problems:    Psychogenic syncope    Syncope and collapse    Orthostatic hypotension  Resolved Problems:    * No resolved hospital problems. *        ASSESSMENT/PLAN:    Syncope and collapse  Orthostatic hypotension   Functional neurological symptom disorder   Psychogenic syncope   - has followed with neurology in the past  - +orthostatic blood pressure post syncopal episode from laying and sitting--unable to stand  - hx of psychogenic syncope  - neurology consulted  - check orthostatic blood pressure every shift  - start IVF   - check troponin  - check EKG  - monitor on telemetry      Depression  SI  - sitter at bedside  - psychiatry consulted         Note above makes patient higher risk for morbidity and mortality requiring testing and treatment.      DVT Prophylaxis: Lovenox

## 2024-07-10 NOTE — PROGRESS NOTES
Patient admitted to room 3014 from [facility]. Patient oriented to room, call light, bed rails, phone, lights and bathroom. Patient instructed about the schedule of the day including: vital sign frequency, lab draws, possible tests, frequency of MD and staff rounds, daily weights, I &O's and prescribed diet.  Bed locked, in lowest position, side rails up 2/4, call light within reach.        Recliner Assessment  Patient is able to demonstrate the ability to move from a reclining position to an upright position within the recliner.       4 Eyes Skin Assessment     NAME:  Alsison Lee  YOB: 2005  MEDICAL RECORD NUMBER:  [id number]    The patient is being assessed for  Admission    I agree that at least one RN has performed a thorough Head to Toe Skin Assessment on the patient. ALL assessment sites listed below have been assessed.      Areas assessed by both nurses:    Head, Face, Ears, Shoulders, Back, Chest, Arms, Elbows, Hands, Sacrum. Buttock, Coccyx, Ischium, and Legs. Feet and Heels        Does the Patient have a Wound? No noted wound(s)       Tigre Prevention initiated by RN: No  Wound Care Orders initiated by RN: No    Pressure Injury (Stage 3,4, Unstageable, DTI, NWPT, and Complex wounds) if present, place Wound referral order by RN under : No    New Ostomies, if present place, Ostomy referral order under : No     Nurse 1 eSignature: Electronically signed by Anna Babcock RN on 7/10/24 at 3:26 PM EDT    **SHARE this note so that the co-signing nurse can place an eSignature**    Nurse 2 eSignature: {Esignature:[id number]}

## 2024-07-10 NOTE — TRANSITION OF CARE
Behavioral Health Transition Record    Patient Name: Alisson Lee  YOB: 2005   Medical Record Number: [id number]  Date of Admission: 7/5/2024  4:49 PM   Date of Discharge: 7/10/2024    Attending Provider: No att. providers found   Discharging Provider: Severiano Cho MD  To contact this individual call [phone] and ask the  to page.  If unavailable, ask to be transferred to Behavioral Health Provider on call.  A Behavioral Health Provider will be available on call 24/7 and during holidays.    Primary Care Provider: Alice Robert MD    No Known Allergies    Reason for Admission: Alisson Lee is a 19 y.o. female who presents to the emergency department today reporting worsening depression and suicidal thoughts.  Patient states that she has a history of cutting and states that she used a kitchen knife this morning to Both arms.  Patient is currently not on any psychiatric medications.  Patient has been admitted to inpatient psych in the past, states that she is agreeable to admission but does not want to go to [facility].  Patient denies excessive alcohol consumption.  She denies illicit drug use.  She denies visual or auditory hallucinations.  Patient states that she has trouble sleeping.  She denies overdosing.  Patient states \"I just do not want to live anymore\".     Admission Diagnosis: Depression, unspecified [F32.A]    * No surgery found *    Results for orders placed or performed during the hospital encounter of 07/05/24   POCT Glucose   Result Value Ref Range    POC Glucose 73 70 - 99 mg/dl    Performed on ACCU-CHEK        Immunizations administered during this encounter:   Immunization History   Administered Date(s) Administered    COVID-19, MODERNA, (2023-24 formula), (age 12y+), IM, 50mcg/0.5mL 02/29/2024     Influenza Vaccination Status: None of the above/Not documented/Unable to determine from medical record documentation    Screening for Metabolic

## 2024-07-10 NOTE — PROGRESS NOTES
Group Therapy Note    Date: 7/9/2024  Start Time: 20:00  End Time:  21:00  Number of Participants: 5    Type of Group: Recreational  wrap up    Wellness Binder Information  Module Name:  /  Session Number:  /    Patient's Goal:  coping skills    Notes:  continuing to work on goal    Status After Intervention:  Unchanged    Participation Level: Active Listener and Interactive    Participation Quality: Appropriate, Attentive, and Sharing      Speech:  pressured      Thought Process/Content: Logical      Affective Functioning: Blunted      Mood: anxious      Level of consciousness:  Alert and Attentive      Response to Learning: Able to change behavior      Endings: None Reported    Modes of Intervention: Socialization and Problem-solving      Discipline Responsible: Behavorial Health Tech      Signature:  Lawrence Lopez

## 2024-07-10 NOTE — PROGRESS NOTES
Called to evaluate patient.  Rapid response was called right after.  Arrived on unit.  Patient has orders for discahrge to readmit to medical for PCU level of care.  Given ICU bed, as that was the only bed open at the time.  Radha Lombardo RN

## 2024-07-10 NOTE — FLOWSHEET NOTE
07/10/24 1324   Vital Signs   Temp 98.8 °F (37.1 °C)   Temp Source Oral   Pulse 96   Respirations 10   BP Location Right upper arm   BP Method Automatic   Blood Pressure Lying 112/69   Blood Pressure Sitting 113/77   Blood Pressure Standing   (pt refused to stand)   Oxygen Therapy   SpO2 100 %   O2 Device None (Room air)     Pt admitted to ICU for orthostatic hypotension from [facility]. Pt originally admitted to [facility] for increased depression and suicidal ideation. SI remains active. Sitter at bedside. During [facility] rounds pt was found on the floor with feet up. Pt was responsive but lethargic, cool/clammy. Blood glucose 73, head CT negative. Neuro consulted. Lexapro first dose today. Upon arrival to ICU-pt not orthostatic but refused to stand for BP measurement. Pt has a flat affect-responds with minimal input.    Anna Babcock, RN

## 2024-07-10 NOTE — PROGRESS NOTES
Department of Psychiatry  Progress Note    Patient's chart was reviewed. Discussed with treatment team. Met with patient.     SUBJECTIVE:      Some objective signs of improvement - brighter affect; improved EC; smiling at times.    Feels she's benefiting from groups and decent sleep.    Continues to voice severe depression and anxiety.     Tolerating Lexparo well.     ROS:   Patient has new complaints: no  Sleeping adequately:  Yes   Appetite adequate: Yes  Engaged in programming: yes    OBJECTIVE:  VITALS:  /73   Pulse 79   Temp 98.8 °F (37.1 °C) (Oral)   Resp 16   Ht 1.6 m (5' 2.99\")   Wt 54.1 kg (119 lb 4.3 oz)   LMP 07/01/2024 Comment: Currently menstrating  SpO2 98%   BMI 21.13 kg/m²     Mental Status Examination:    Appearance: fair grooming and hygiene  Behavior/Attitude toward examiner:  improved EC  Speech: more communicative  Mood:  \"ok\"  Affect:  brighter  Thought processes:  Goal directed, linear, no MARISABEL or gross disorganization  Thought Content: less SI, no HI, no delusions voiced, no obsessions  Perceptions: no AVH  Attention: attention span and concentration were intact to interview   Abstraction: intact  Cognition:  Alert and oriented to person, place, time, and situation, recall intact  Insight: fair  Judgment: fair    Medication:  Scheduled:   escitalopram  10 mg Oral Daily        PRN:  acetaminophen, hydrOXYzine HCl, traZODone, nicotine polacrilex     FORMULATION:  This is a domiciled, never , and employed 19-year-old with history of depression and ADD, whose friend brought her to [facility] Emergency Department with worsening symptoms of depression and thoughts of suicide.  She meets criteria for major depressive episode.  She is at risk and requires inpatient stabilization.     Principal Problem:    Severe episode of recurrent major depressive disorder, without psychotic features (HCC)  Resolved Problems:    * No resolved hospital problems. *     PLAN:    1. Admitted to  Psychiatry for further evaluation and stabilization.    2. On admission, discussed treatment options at length and agreed to start Lexapro.  Start 5 mg today and increase to 10 mg tomorrow.  Order q.15-minute checks for safety, programming, and p.r.n. medication for anxiety, agitation, insomnia.    7/8/2024 - Remains depressed. No EC; limited engagement; flat affect.  Attending groups and journaling.  Tolerating Lexapro well so far.     7/9/2024 - Some objective signs of improvement - brighter affect; improved EC; smiling at times. Feels she's benefiting from groups and decent sleep.  Continues to voice severe depression and anxiety.  Tolerating Lexparo well.     3. Hospitalist consult for admission.  #Migraines   -follows with neurology      #Hematuria   -menstruating    4. Collateral information if available for diagnostic clarification and care coordination.    5. Estimated length of stay 5-8 days.  She was transferred on a Statement of Belief but agreed to stay voluntarily.    Total time with patient was 50 minutes and more than 50 % of that time was spent counseling the patient on their symptoms, treatment, and expected goals.     Severiano Cho MD

## 2024-07-10 NOTE — BH NOTE
Clinician called two seperate days to make an appointment and left messages and did not receive a call back to schedule. [facility] Address: [address] [place] [address] [address], [place] [address] Phone: [phone]

## 2024-07-10 NOTE — PLAN OF CARE
Problem: Self Harm/Suicidality  Goal: Will have no self-injury during hospital stay  Description: INTERVENTIONS:  1.  Ensure constant observer at bedside with Q15M safety checks  2.  Maintain a safe environment  3.  Secure patient belongings  4.  Ensure family/visitors adhere to safety recommendations  5.  Ensure safety tray has been added to patient's diet order  6.  Every shift and PRN: Re-assess suicidal risk via Frequent Screener    7/9/2024 1628 by Nj Capone RN  Outcome: Progressing     Problem: Depression  Goal: Will be euthymic at discharge  Description: INTERVENTIONS:  1. Administer medication as ordered  2. Provide emotional support via 1:1 interaction with staff  3. Encourage involvement in milieu/groups/activities  4. Monitor for social isolation  7/9/2024 2029 by Billie Linda RN  Outcome: Progressing  7/9/2024 1628 by Nj Capone RN  Outcome: Progressing     Problem: Behavior  Goal: Pt/Family maintain appropriate behavior and adhere to behavioral management agreement, if implemented  Description: INTERVENTIONS:  1. Assess patient/family's coping skills and  non-compliant behavior (including use of illegal substances)  2. Notify security of behavior or suspected illegal substances which indicate the need for search of the family and/or belongings  3. Encourage verbalization of thoughts and concerns in a socially appropriate manner  4. Utilize positive, consistent limit setting strategies supporting safety of patient, staff and others  5. Encourage participation in the decision making process about the behavioral management agreement  6. If a visitor's behavior poses a threat to safety call refer to organization policy.  7. Initiate consult with , Psychosocial CNS, Spiritual Care as appropriate  Outcome: Progressing     Problem: Anxiety  Goal: Will report anxiety at manageable levels  Description: INTERVENTIONS:  1. Administer medication as ordered  2. Teach and rehearse

## 2024-07-10 NOTE — PROGRESS NOTES
Sitter remains at bedside for suicide precaution. PT resting comfortably on RA. Standard and suicide precautions in place. Call light within reach. No further needs at this time.    Anna Babcock RN

## 2024-07-10 NOTE — PROGRESS NOTES
Bedside report and transfer of care given to SHIRLEY Deutsch. Pt currently resting in bed with the call light within reach. Pt denies any other care needs at this time. Pt stable at this time.    Amie Fuller RN

## 2024-07-10 NOTE — PROGRESS NOTES
During 15 min checks patient was found on floor with feet propped up on bed. She was alert and responding to staff but noted to be clammy and cold, tremors noted with teeth chattering. BS 73 . V.s.s. charge and clinical made aware. Rapid called. Pt states this has happened in past when her body is trying to sleep. She is unsure if she hit her head and states her journal has what happened in it prior to her fall.    \" I feel really not good right now. I feel like I could pass out my vision is getting blurrier and blurrier. Its getting progressively harder to look up things are moving weird. My throat feels cold my head hurts I feel really weak and tired. I feel sick\"     Writing noted to be clear and straight on lines.  Ortho BP noted to drop when sitting patient up BP dropped to 98/45. Order for STAT CT of head and transfer to ICU at this time pt being taken to ER.

## 2024-07-10 NOTE — FLOWSHEET NOTE
07/10/24 1324   Vital Signs   Temp 98.8 °F (37.1 °C)   Temp Source Oral   Pulse 96   Respirations 10   BP Location Right upper arm   BP Method Automatic   Blood Pressure Lying 112/69   Blood Pressure Sitting 113/77   Blood Pressure Standing   (pt refused to stand)   Oxygen Therapy   SpO2 100 %   O2 Device None (Room air)     Pt admitted to ICU for orthostatic hypotension from [facility]. Pt originally admitted to [facility] for increased depression and suicidal ideation. SI remains active. Sitter at bedside. During [facility] rounds pt was found on the floor with feet up. Pt was responsive but lethargic, cool/clammy. Blood glucose 73, head CT negative. Neuro consulted. Lexapro first dose today. Upon arrival to ICU-pt not orthostatic but refused to stand for BP measurement. Pt has a flat affect-responds with minimal input     Anna Babcock RN

## 2024-07-10 NOTE — BH NOTE
Group Therapy Note     Date: 7/10/2024     Group Start Time: 1000  Group End Time: 1045  Group Topic: Cognitive Skills     [facility]    Phuong Joyner, WAQAS     Participants engaged in discussions on conflict. Participants listened to education on the \"four horsemen of conflict\" that include criticism, defensiveness, contempt, and stonewalling. They engaged in discussions to process how I-statements can increase productivity when processing a conflict.      Group Therapy Note     Attendees: 9           Notes:  Alisson engaged in discussions and shared personal connection to conflict. Alisson was able to accept feedback from others and was appropriate throughout.      Status After Intervention:  Improved     Participation Level: Active Listener and Interactive     Participation Quality: Appropriate        Speech:  normal        Thought Process/Content: Logical        Affective Functioning: Congruent        Mood: anxious        Level of consciousness:  Alert        Response to Learning: Able to verbalize current knowledge/experience        Endings: None Reported     Modes of Intervention: Education, Support, Socialization, and Exploration        Discipline Responsible: /Counselor

## 2024-07-10 NOTE — FLOWSHEET NOTE
07/10/24 1324   Vital Signs   Temp 98.8 °F (37.1 °C)   Temp Source Oral   Pulse 96   Respirations 10   BP Location Right upper arm   BP Method Automatic   Blood Pressure Lying 112/69   Blood Pressure Sitting 113/77   Blood Pressure Standing   (pt refused to stand)   Oxygen Therapy   SpO2 100 %   O2 Device None (Room air)     Patient arrived to unit from [facility]. Vitals completed, orthos negative. Patient refused to stand for BP measurement at this time. Assessment and medications deferred to nurse orientee, under the supervision of primary RN. Sitter at bedside.     Amie Fuller RN

## 2024-07-10 NOTE — BH NOTE
Patient alert and oriented x 3. Patient rates Depression 9/10 and Anxiety 7/10. Patient seclusive to her bed and room this evening. Patient denies SI/HI/A/V/H. Patient states, \"I feel safe here and don't want to harm myself, but when I leave the hospital. I am going to kill myself.\" Patient doesn't have HS medications scheduled. Patient medicated with Trazodone for sleep. Patient resting quietly in bed. No distress noted.

## 2024-07-11 LAB
ANION GAP SERPL CALCULATED.3IONS-SCNC: 7 MMOL/L (ref 3–16)
BASOPHILS # BLD: 0 K/UL (ref 0–0.2)
BASOPHILS NFR BLD: 0.5 %
BUN SERPL-MCNC: 14 MG/DL (ref 7–20)
CALCIUM SERPL-MCNC: 8.2 MG/DL (ref 8.3–10.6)
CHLORIDE SERPL-SCNC: 106 MMOL/L (ref 99–110)
CO2 SERPL-SCNC: 23 MMOL/L (ref 21–32)
CREAT SERPL-MCNC: <0.5 MG/DL (ref 0.6–1.1)
DEPRECATED RDW RBC AUTO: 13.4 % (ref 12.4–15.4)
EOSINOPHIL # BLD: 0.2 K/UL (ref 0–0.6)
EOSINOPHIL NFR BLD: 3.2 %
GFR SERPLBLD CREATININE-BSD FMLA CKD-EPI: >90 ML/MIN/{1.73_M2}
GLUCOSE SERPL-MCNC: 84 MG/DL (ref 70–99)
HCT VFR BLD AUTO: 35.5 % (ref 36–48)
HGB BLD-MCNC: 12.4 G/DL (ref 12–16)
LYMPHOCYTES # BLD: 3.9 K/UL (ref 1–5.1)
LYMPHOCYTES NFR BLD: 53.3 %
MCH RBC QN AUTO: 31 PG (ref 26–34)
MCHC RBC AUTO-ENTMCNC: 35 G/DL (ref 31–36)
MCV RBC AUTO: 88.6 FL (ref 80–100)
MONOCYTES # BLD: 0.6 K/UL (ref 0–1.3)
MONOCYTES NFR BLD: 8 %
NEUTROPHILS # BLD: 2.5 K/UL (ref 1.7–7.7)
NEUTROPHILS NFR BLD: 35 %
PLATELET # BLD AUTO: 268 K/UL (ref 135–450)
PMV BLD AUTO: 8.6 FL (ref 5–10.5)
POTASSIUM SERPL-SCNC: 4.1 MMOL/L (ref 3.5–5.1)
RBC # BLD AUTO: 4.01 M/UL (ref 4–5.2)
SODIUM SERPL-SCNC: 136 MMOL/L (ref 136–145)
WBC # BLD AUTO: 7.2 K/UL (ref 4–11)

## 2024-07-11 PROCEDURE — 6370000000 HC RX 637 (ALT 250 FOR IP): Performed by: NURSE PRACTITIONER

## 2024-07-11 PROCEDURE — 2580000003 HC RX 258: Performed by: NURSE PRACTITIONER

## 2024-07-11 PROCEDURE — 85025 COMPLETE CBC W/AUTO DIFF WBC: CPT

## 2024-07-11 PROCEDURE — 95816 EEG AWAKE AND DROWSY: CPT | Performed by: PSYCHIATRY & NEUROLOGY

## 2024-07-11 PROCEDURE — 99232 SBSQ HOSP IP/OBS MODERATE 35: CPT | Performed by: INTERNAL MEDICINE

## 2024-07-11 PROCEDURE — 1200000000 HC SEMI PRIVATE

## 2024-07-11 PROCEDURE — 80048 BASIC METABOLIC PNL TOTAL CA: CPT

## 2024-07-11 PROCEDURE — 95816 EEG AWAKE AND DROWSY: CPT

## 2024-07-11 PROCEDURE — 6370000000 HC RX 637 (ALT 250 FOR IP)

## 2024-07-11 PROCEDURE — 36415 COLL VENOUS BLD VENIPUNCTURE: CPT

## 2024-07-11 PROCEDURE — 6360000002 HC RX W HCPCS: Performed by: NURSE PRACTITIONER

## 2024-07-11 RX ORDER — ESCITALOPRAM OXALATE 10 MG/1
10 TABLET ORAL DAILY
Status: DISCONTINUED | OUTPATIENT
Start: 2024-07-11 | End: 2024-07-12 | Stop reason: HOSPADM

## 2024-07-11 RX ADMIN — Medication 10 ML: at 20:45

## 2024-07-11 RX ADMIN — SODIUM CHLORIDE: 9 INJECTION, SOLUTION INTRAVENOUS at 00:32

## 2024-07-11 RX ADMIN — MUPIROCIN: 20 OINTMENT TOPICAL at 11:02

## 2024-07-11 RX ADMIN — ESCITALOPRAM OXALATE 10 MG: 10 TABLET ORAL at 15:53

## 2024-07-11 RX ADMIN — MUPIROCIN: 20 OINTMENT TOPICAL at 20:45

## 2024-07-11 RX ADMIN — ENOXAPARIN SODIUM 40 MG: 100 INJECTION SUBCUTANEOUS at 11:02

## 2024-07-11 RX ADMIN — Medication 10 ML: at 15:51

## 2024-07-11 ASSESSMENT — PAIN DESCRIPTION - PAIN TYPE: TYPE: ACUTE PAIN

## 2024-07-11 ASSESSMENT — PAIN SCALES - GENERAL
PAINLEVEL_OUTOF10: 6
PAINLEVEL_OUTOF10: 6

## 2024-07-11 ASSESSMENT — PAIN DESCRIPTION - FREQUENCY: FREQUENCY: CONTINUOUS

## 2024-07-11 ASSESSMENT — PAIN DESCRIPTION - ONSET: ONSET: PROGRESSIVE

## 2024-07-11 ASSESSMENT — PAIN DESCRIPTION - LOCATION: LOCATION: HEAD

## 2024-07-11 ASSESSMENT — PAIN DESCRIPTION - DESCRIPTORS: DESCRIPTORS: ACHING

## 2024-07-11 ASSESSMENT — PAIN - FUNCTIONAL ASSESSMENT: PAIN_FUNCTIONAL_ASSESSMENT: ACTIVITIES ARE NOT PREVENTED

## 2024-07-11 ASSESSMENT — PAIN DESCRIPTION - ORIENTATION: ORIENTATION: MID

## 2024-07-11 NOTE — PROGRESS NOTES
Pt remains awake a&ox4, Reassessment unchanged and as charted ,  remains @ bedside w/in arms reach at all times.

## 2024-07-11 NOTE — PROGRESS NOTES
Psychiatry consult placed on list of on call, Electronically signed by Federico Pham on 7/11/2024 at 7:25 AM

## 2024-07-11 NOTE — PROGRESS NOTES
Reassessment done,patient is calm,on room air on NS at 100mls/hr,has a sitter at bedside,is able to walk to the bahroom with minimal assistance.  No changes in reassessment.

## 2024-07-11 NOTE — CONSULTS
Inpatient Neurology consult        [facility]            Alisson Lee  2005    Date of Service: 7/11/2024    Referring Physician: Vivien Hicks DO      Reason for the consult and CC: Syncope and collapse     HPI:   The patient is a 19 y.o. female with a  past medical history of ADHD, depression, and psychogenic syncope (diagnosed in 2022) originally presenting to the hospital for concerns of SI with intent. While patient was on behavioral she had a syncopal episode and was transferred to the ICU for further medical management. Neurology has been consulted for evaluation and management of syncope and collapse.        Constitutional:   Vitals:    07/11/24 0900 07/11/24 1000 07/11/24 1100 07/11/24 1200   BP: 106/66 104/79 107/73 104/71   Pulse: 73 81 68 73   Resp: 20 20 21 22   Temp:    98 °F (36.7 °C)   TempSrc:    Temporal   SpO2: 100% 100% 100% 100%   Weight:       Height:             I personally reviewed and updated social history, past medical history, medications, allergy, surgical history, and family history as documented in the patient's electronic health records.       ROS: 10-14 ROS reviewed with the patient/nurse/family which were unremarkable except mentioned in H&P.    General appearance:  Normal development and appear in no acute distress.   Mental Status:   Oriented to person, place, problem, and time.    Memory: Good immediate recall.  Intact remote memory  Normal attention span and concentration.  Language: intact naming, repeating and fluency   Good fund of Knowledge. Aware of current events and vocabulary   Cranial Nerves:   II: Visual fields: Full. Pupils: equal, round, reactive to light, bilaterally  III,IV,VI: Extra Ocular Movements are intact. No nystagmus  V: Facial sensation is intact  VII: Facial strength and movements: intact and symmetric  IX: Normal palatal elevation and shoulder shrug  XII: Tongue movements are normal  Musculoskeletal: 5/5 in all 4 extremities.  Good  GLUCOSE 84 07/11/2024 04:52 AM    CALCIUM 8.2 07/11/2024 04:52 AM     Lab Results   Component Value Date/Time    WBC 7.2 07/11/2024 04:52 AM    RBC 4.01 07/11/2024 04:52 AM    HGB 12.4 07/11/2024 04:52 AM    HCT 35.5 07/11/2024 04:52 AM    MCV 88.6 07/11/2024 04:52 AM    RDW 13.4 07/11/2024 04:52 AM     07/11/2024 04:52 AM   No results found for: \"INR\", \"PROTIME\"      Impression:  Syncope  CT head without revealed: No evidence of acute intracranial abnormality.     Recommendation:   EEG pending       Thank you for referring such patient. If you have any questions regarding my consult note, please don't hesitate to call me.     ESTEBAN Peraza - NP    This dictation was generated by voice recognition computer software. Although all attempts are made to edit the dictation for accuracy, there may be errors in the  transcription that are not intended    Addendum:  The patient has had about 20 episodes since the onset in 2022.  The patient never had EEG done in the past with  [facility].  The EEG at this time showed no evidence of seizure tendency.  The patient may require EEG monitoring for spell classification in the future in  as this cannot completely exclude epileptic disorder.  There is no need to start antiseizure medication from neurology point of view at this time.  The patient may return to behavioral health unit.

## 2024-07-11 NOTE — PROGRESS NOTES
Pt awake a&o x4, VSS Assessment as charted,  Suicide precautions in place; safety try ordered,  @ bedside w/in arms reach at all times , Psychiatry Consult placed and called.

## 2024-07-11 NOTE — PROCEDURES
INTERPRETATION:  This 25-minute, computer-assisted video EEG recording is normal.  No potentially epileptiform activity was present during the recording.      CLASSIFICATION:  Normal (awake and drowsy).  Sleep - unsuccessful.  EKG channel.    DESCRIPTION:    BACKGROUND:  The awake recording revealed 9 Hz alpha activity over the posterior head region.  Given the extensive study, the patient still did not sleep.  The EEG showed normal V waves during drowsiness.  There was no significant change on the EEG background with photic stimulation or hyperventilation.    INTERICTAL DISCHARGES: None    CLINICAL EVENTS:  None    The EKG channel was unremarkable.

## 2024-07-11 NOTE — PROGRESS NOTES
IM Progress Note    Admit Date:  7/10/2024  1    Interval history:  transferred from [facility] for possible syncope  Pt wrote in there notebook prior to this episode that \" I feel really not good right now. I feel like I could pass out my vision is getting blurrier and blurrier. Its getting progressively harder to look up things are moving weird. My throat feels cold my head hurts I feel really weak and tired. I feel sick.\"  Staff found her on the floor with legs up on the bed, pale and diaphoretic     Subjective:  Ms. Lee seen up in bed, reports she has been admitted to [facility] for suicidal symptoms    Reports hx of syncope multiple times, reports atleast 20 times in the past with no clear injuries. Happens with stress only per pt   Feels light headeded, dizzy and falls, no headinjuries so far and happened at home, work and in hospital   Never happened during driving or shower     No hx of urinary or stool incontinence  No hx of seizures  No previous cardiac or pulm disease    Objective:   BP 95/64   Pulse 61   Temp 97.3 °F (36.3 °C) (Temporal)   Resp 18   Ht 1.6 m (5' 2.99\")   Wt 54.1 kg (119 lb 4.3 oz)   LMP 07/01/2024 Comment: ended 7/8  SpO2 98%   BMI 21.13 kg/m²     Intake/Output Summary (Last 24 hours) at 7/11/2024 0721  Last data filed at 7/11/2024 0607  Gross per 24 hour   Intake 1804.2 ml   Output --   Net 1804.2 ml       Physical Exam:        General:  young female Awake, alert and oriented. Appears to be not in any distress  Mucous Membranes:  Pink , anicteric  Neck: No JVD, no carotid bruit, no thyromegaly  Chest:  Clear to auscultation bilaterally, no added sounds  Cardiovascular:  RRR S1S2 heard, no murmurs or gallops  Abdomen:  Soft, undistended, non tender, no organomegaly, BS present  Extremities: No edema or cyanosis. Distal pulses well felt  Neurological : grossly normal  Cn 2 to 12 intact  Coordination normal   Upper ext and LE strength normal   Coordination normal   Gait normal and ambulating

## 2024-07-11 NOTE — PROGRESS NOTES
Patient requested to have ibuprofen instead of tylenol,she stated tylenol does not help with the frequent headaches she get and that she takes 4-6 ibuprofen at home daily.Perfect serve sent to the nocturnist.

## 2024-07-11 NOTE — CONSULTS
Department of Psychiatry  Progress Note    Patient's chart was reviewed. Met with patient.     SUBJECTIVE:      Patient reports this was a typical episode of her functional neurological disorder. She's had many in her lifetime and has been seen by neurology.    Although she reports making gains and has a brighter affect, she continues with depression and SI.     ROS:   Patient has new complaints: no  Sleeping adequately:  Yes   Appetite adequate: Yes    OBJECTIVE:  VITALS:  /73   Pulse 85   Temp 98 °F (36.7 °C) (Temporal)   Resp 17   Ht 1.6 m (5' 2.99\")   Wt 54.1 kg (119 lb 4.3 oz)   LMP 07/01/2024 Comment: ended 7/8  SpO2 100%   BMI 21.13 kg/m²     Mental Status Examination:    Appearance: fair grooming and hygiene  Behavior/Attitude toward examiner:  pleasant  Speech: non-pressured  Mood:  \"down\"  Affect:  flat  Thought processes:  Goal directed, linear, no MARISABEL or gross disorganization  Thought Content: + SI, no HI, no delusions voiced, no obsessions  Perceptions: no AVH  Attention: attention span and concentration were intact to interview   Abstraction: intact  Cognition:  Alert and oriented to person, place, time, and situation, recall intact  Insight: fair  Judgment: fair    Medication:  Scheduled:   mupirocin   Each Nostril BID    sodium chloride flush  5-40 mL IntraVENous 2 times per day    enoxaparin  40 mg SubCUTAneous Daily        PRN:  acetaminophen, hydrOXYzine HCl, sodium chloride flush, sodium chloride, potassium chloride **OR** potassium alternative oral replacement **OR** potassium chloride, magnesium sulfate, ondansetron **OR** ondansetron, polyethylene glycol     FORMULATION: This is a domiciled, never , and employed 19-year-old with history of depression and ADD, whose friend brought her to [facility] Department with worsening symptoms of depression and thoughts of suicide. She meets criteria for major depressive episode. She is at risk and requires inpatient

## 2024-07-11 NOTE — PLAN OF CARE
Problem: Discharge Planning  Goal: Discharge to home or other facility with appropriate resources  Outcome: Progressing  Flowsheets (Taken 7/10/2024 1511 by Anna Babcock RN)  Discharge to home or other facility with appropriate resources:   Identify barriers to discharge with patient and caregiver   Identify discharge learning needs (meds, wound care, etc)     Problem: Self Harm/Suicidality  Goal: Will have no self-injury during hospital stay  Description: INTERVENTIONS:  1.  Ensure constant observer at bedside with Q15M safety checks  2.  Maintain a safe environment  3.  Secure patient belongings  4.  Ensure family/visitors adhere to safety recommendations  5.  Ensure safety tray has been added to patient's diet order  6.  Every shift and PRN: Re-assess suicidal risk via Frequent Screener    Outcome: Progressing     Problem: Risk for Elopement  Goal: Patient will not exit the unit/facility without proper excort  Outcome: Progressing     Problem: Pain  Goal: Verbalizes/displays adequate comfort level or baseline comfort level  Outcome: Progressing

## 2024-07-11 NOTE — PROGRESS NOTES
Shift assessment done,patient is awake,alert and oriented X 4.  She is on room air,has clear breath sounds bilaterally.  She is on NS at 100mls/hr.  All ICU lines and connections checked.  She is on a regular diet.  She is able to walk and use the bathroom,close monitoring for dizziness is done.  She has a sitter at bedside.  Bed is at its lowest level,call light within reach,sitter at bedside,will continue to monitor patient.

## 2024-07-11 NOTE — PROGRESS NOTES
Handoff report given to night nurse La Nena , pt awake a&o x4, pt stbale @ handoff with constant observer @ bedside w/in arms reach at all times. Suicide risk communicated to night nurse La Nena @ handoff

## 2024-07-12 ENCOUNTER — HOSPITAL ENCOUNTER (INPATIENT)
Age: 19
LOS: 4 days | Discharge: HOME | End: 2024-07-16
Attending: PSYCHIATRY & NEUROLOGY | Admitting: PSYCHIATRY & NEUROLOGY

## 2024-07-12 VITALS
SYSTOLIC BLOOD PRESSURE: 112 MMHG | BODY MASS INDEX: 21.13 KG/M2 | HEART RATE: 98 BPM | RESPIRATION RATE: 16 BRPM | WEIGHT: 119.27 LBS | OXYGEN SATURATION: 99 % | TEMPERATURE: 98.7 F | HEIGHT: 63 IN | DIASTOLIC BLOOD PRESSURE: 63 MMHG

## 2024-07-12 PROBLEM — F33.9 MAJOR DEPRESSION, RECURRENT (HCC): Status: ACTIVE | Noted: 2024-07-12

## 2024-07-12 PROCEDURE — APPSS30 APP SPLIT SHARED TIME 16-30 MINUTES

## 2024-07-12 PROCEDURE — 6360000002 HC RX W HCPCS: Performed by: NURSE PRACTITIONER

## 2024-07-12 PROCEDURE — 99232 SBSQ HOSP IP/OBS MODERATE 35: CPT | Performed by: PSYCHIATRY & NEUROLOGY

## 2024-07-12 PROCEDURE — 2580000003 HC RX 258: Performed by: NURSE PRACTITIONER

## 2024-07-12 PROCEDURE — 6370000000 HC RX 637 (ALT 250 FOR IP): Performed by: PSYCHIATRY & NEUROLOGY

## 2024-07-12 PROCEDURE — 1240000000 HC EMOTIONAL WELLNESS R&B

## 2024-07-12 PROCEDURE — 99238 HOSP IP/OBS DSCHRG MGMT 30/<: CPT | Performed by: INTERNAL MEDICINE

## 2024-07-12 PROCEDURE — 6370000000 HC RX 637 (ALT 250 FOR IP)

## 2024-07-12 RX ORDER — IBUPROFEN 400 MG/1
400 TABLET ORAL EVERY 6 HOURS PRN
Status: DISCONTINUED | OUTPATIENT
Start: 2024-07-12 | End: 2024-07-16

## 2024-07-12 RX ORDER — HYDROXYZINE HYDROCHLORIDE 25 MG/1
25 TABLET, FILM COATED ORAL EVERY 6 HOURS PRN
Status: DISCONTINUED | OUTPATIENT
Start: 2024-07-12 | End: 2024-07-16

## 2024-07-12 RX ORDER — NICOTINE 21 MG/24HR
1 PATCH, TRANSDERMAL 24 HOURS TRANSDERMAL DAILY
Status: DISCONTINUED | OUTPATIENT
Start: 2024-07-13 | End: 2024-07-14

## 2024-07-12 RX ORDER — POLYETHYLENE GLYCOL 3350 17 G
2 POWDER IN PACKET (EA) ORAL
Status: DISCONTINUED | OUTPATIENT
Start: 2024-07-12 | End: 2024-07-16 | Stop reason: HOSPADM

## 2024-07-12 RX ORDER — MAGNESIUM HYDROXIDE/ALUMINUM HYDROXICE/SIMETHICONE 120; 1200; 1200 MG/30ML; MG/30ML; MG/30ML
30 SUSPENSION ORAL EVERY 6 HOURS PRN
Status: DISCONTINUED | OUTPATIENT
Start: 2024-07-12 | End: 2024-07-16 | Stop reason: HOSPADM

## 2024-07-12 RX ORDER — OLANZAPINE 10 MG/1
10 TABLET ORAL EVERY 4 HOURS PRN
Status: DISCONTINUED | OUTPATIENT
Start: 2024-07-12 | End: 2024-07-16

## 2024-07-12 RX ORDER — ESCITALOPRAM OXALATE 10 MG/1
10 TABLET ORAL DAILY
Status: DISCONTINUED | OUTPATIENT
Start: 2024-07-13 | End: 2024-07-16 | Stop reason: HOSPADM

## 2024-07-12 RX ORDER — TRAZODONE HYDROCHLORIDE 50 MG/1
50 TABLET ORAL NIGHTLY
Status: DISCONTINUED | OUTPATIENT
Start: 2024-07-12 | End: 2024-07-16

## 2024-07-12 RX ORDER — HYDROXYZINE 50 MG/1
50 TABLET, FILM COATED ORAL 3 TIMES DAILY PRN
Status: DISCONTINUED | OUTPATIENT
Start: 2024-07-12 | End: 2024-07-16

## 2024-07-12 RX ORDER — HYDROXYZINE 50 MG/1
50 TABLET, FILM COATED ORAL 3 TIMES DAILY PRN
Status: CANCELLED | OUTPATIENT
Start: 2024-07-12

## 2024-07-12 RX ORDER — BENZTROPINE MESYLATE 1 MG/ML
2 INJECTION INTRAMUSCULAR; INTRAVENOUS 2 TIMES DAILY PRN
Status: DISCONTINUED | OUTPATIENT
Start: 2024-07-12 | End: 2024-07-16

## 2024-07-12 RX ORDER — ESCITALOPRAM OXALATE 10 MG/1
10 TABLET ORAL DAILY
Status: CANCELLED | OUTPATIENT
Start: 2024-07-13

## 2024-07-12 RX ORDER — ONDANSETRON 4 MG/1
4 TABLET, ORALLY DISINTEGRATING ORAL EVERY 8 HOURS PRN
Status: DISCONTINUED | OUTPATIENT
Start: 2024-07-12 | End: 2024-07-16

## 2024-07-12 RX ORDER — ACETAMINOPHEN 325 MG/1
650 TABLET ORAL EVERY 4 HOURS PRN
Status: DISCONTINUED | OUTPATIENT
Start: 2024-07-12 | End: 2024-07-16

## 2024-07-12 RX ORDER — TRAZODONE HYDROCHLORIDE 50 MG/1
50 TABLET ORAL NIGHTLY PRN
Status: DISCONTINUED | OUTPATIENT
Start: 2024-07-12 | End: 2024-07-16 | Stop reason: HOSPADM

## 2024-07-12 RX ADMIN — ENOXAPARIN SODIUM 40 MG: 100 INJECTION SUBCUTANEOUS at 10:52

## 2024-07-12 RX ADMIN — Medication 10 ML: at 10:53

## 2024-07-12 RX ADMIN — MUPIROCIN: 20 OINTMENT TOPICAL at 10:54

## 2024-07-12 RX ADMIN — TRAZODONE HYDROCHLORIDE 50 MG: 50 TABLET ORAL at 21:56

## 2024-07-12 RX ADMIN — ESCITALOPRAM OXALATE 10 MG: 10 TABLET ORAL at 10:53

## 2024-07-12 ASSESSMENT — PATIENT HEALTH QUESTIONNAIRE - PHQ9
8. MOVING OR SPEAKING SO SLOWLY THAT OTHER PEOPLE COULD HAVE NOTICED. OR THE OPPOSITE, BEING SO FIGETY OR RESTLESS THAT YOU HAVE BEEN MOVING AROUND A LOT MORE THAN USUAL: MORE THAN HALF THE DAYS
6. FEELING BAD ABOUT YOURSELF - OR THAT YOU ARE A FAILURE OR HAVE LET YOURSELF OR YOUR FAMILY DOWN: NEARLY EVERY DAY
3. TROUBLE FALLING OR STAYING ASLEEP: MORE THAN HALF THE DAYS
SUM OF ALL RESPONSES TO PHQ QUESTIONS 1-9: 21
SUM OF ALL RESPONSES TO PHQ QUESTIONS 1-9: 19
7. TROUBLE CONCENTRATING ON THINGS, SUCH AS READING THE NEWSPAPER OR WATCHING TELEVISION: NEARLY EVERY DAY
1. LITTLE INTEREST OR PLEASURE IN DOING THINGS: NEARLY EVERY DAY
SUM OF ALL RESPONSES TO PHQ9 QUESTIONS 1 & 2: 6
SUM OF ALL RESPONSES TO PHQ QUESTIONS 1-9: 21
4. FEELING TIRED OR HAVING LITTLE ENERGY: MORE THAN HALF THE DAYS
5. POOR APPETITE OR OVEREATING: SEVERAL DAYS
SUM OF ALL RESPONSES TO PHQ QUESTIONS 1-9: 21
10. IF YOU CHECKED OFF ANY PROBLEMS, HOW DIFFICULT HAVE THESE PROBLEMS MADE IT FOR YOU TO DO YOUR WORK, TAKE CARE OF THINGS AT HOME, OR GET ALONG WITH OTHER PEOPLE: EXTREMELY DIFFICULT
9. THOUGHTS THAT YOU WOULD BE BETTER OFF DEAD, OR OF HURTING YOURSELF: MORE THAN HALF THE DAYS
2. FEELING DOWN, DEPRESSED OR HOPELESS: NEARLY EVERY DAY

## 2024-07-12 ASSESSMENT — SLEEP AND FATIGUE QUESTIONNAIRES
SLEEP PATTERN: DIFFICULTY FALLING ASLEEP;DISTURBED/INTERRUPTED SLEEP;NIGHTMARES/TERRORS
DO YOU HAVE DIFFICULTY SLEEPING: YES
AVERAGE NUMBER OF SLEEP HOURS: 6
DO YOU USE A SLEEP AID: YES

## 2024-07-12 ASSESSMENT — PAIN SCALES - GENERAL: PAINLEVEL_OUTOF10: 0

## 2024-07-12 NOTE — BH NOTE
Admitted to unit, dressed in safety gown, belongings  from patient and at Team Station for sorting and placement. Alert and oriented. Pleasant and cooperative. Oriented to room and unit.

## 2024-07-12 NOTE — DISCHARGE SUMMARY
Name:  Alisson Lee  Room:  3014/3014-01  MRN:    [id number]    IM Discharge Summary    Discharging Physician:  Jairon garcia MD    Admit: 7/10/2024    Discharge:      PCP      Alice Robert MD    Diagnoses and hospital course  this Admission          Syncope      - has followed with neurology in the past  - +orthostatic blood pressure post syncopal episode from laying and sitting--unable to stand per report  - given hx I suspect psychogenic origin as pt wrote in the journal that she is going to pass out and multiple previous episodes also with stress related     - neurology consulted  - checked orthostatic blood pressure and was normal here   - no arrhythmia or hypotension noted since admission   - ct head neg, EEG neg   - neurology recommends outpt follow up for further testing           Depression  SI  - sitter at bedside  - psychiatry consulted and plans to return to [facility]      HPI   19 y.o. 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  Pt was seen and evaluated after possible syncopal episode on [facility] today.  Pt wrote in there notebook prior to this episode that \" I feel really not good right now. I feel like I could pass out my vision is getting blurrier and blurrier. Its getting progressively harder to look up things are moving weird. My throat feels cold my head hurts I feel really weak and tired. I feel sick.\"  Staff found her on the floor with legs up on the bed, pale and diaphoretic.  Pt stated that she fell, she thinks she hit her head.  When asking patient what happened she whispered she wrote it in her journal which is as above.  She denied any pain or discomfort from fall, no headache, neck pain, or extremity pain.  She was noted to be orthostatic from lying to sitting position.  She was taken to CT and transferred to the medical floor for further work-up.     Physical Exam at Discharge:        General:  young  these medications      amitriptyline 25 MG tablet  Commonly known as: ELAVIL     ondansetron 4 MG disintegrating tablet  Commonly known as: Zofran ODT  Take 1 tablet by mouth every 8 hours as needed for Nausea     PROZAC PO                Discharge Condition/Location: stable/BHI     Follow Up:    PCP in 1 weeks      Time Spent on discharge is more than 28 minutes in the examination, evaluation, counseling and review of medications and discharge plan.      Jairon Lopez MD, 7/12/2024 7:47 AM

## 2024-07-12 NOTE — PLAN OF CARE
Problem: Self Harm/Suicidality  Goal: Will have no self-injury during hospital stay  Description: INTERVENTIONS:  1.  Ensure constant observer at bedside with Q15M safety checks  2.  Maintain a safe environment  3.  Secure patient belongings  4.  Ensure family/visitors adhere to safety recommendations  5.  Ensure safety tray has been added to patient's diet order  6.  Every shift and PRN: Re-assess suicidal risk via Frequent Screener    7/12/2024 1019 by Nj Capone RN  Outcome: Progressing  7/12/2024 1018 by jN Capone RN  Outcome: Progressing  7/11/2024 2303 by La Nena Fragoso RN  Outcome: Progressing     Problem: Pain  Goal: Verbalizes/displays adequate comfort level or baseline comfort level  7/12/2024 1019 by Nj Capone RN  Outcome: Progressing  7/11/2024 2303 by La Nena Fragoso RN  Outcome: Progressing     Problem: Depression  Goal: Will be euthymic at discharge  Description: INTERVENTIONS:  1. Administer medication as ordered  2. Provide emotional support via 1:1 interaction with staff  3. Encourage involvement in milieu/groups/activities  4. Monitor for social isolation  Outcome: Progressing     Problem: Self Care Deficit  Goal: Return ADL status to a safe level of function  Description: INTERVENTIONS:  1. Administer medication as ordered  2. Assess ADL deficits and provide assistive devices as needed  3. Obtain PT/OT consults as needed  4. Assist and instruct patient to increase activity and self care as tolerated  Outcome: Progressing     Problem: Sleep Disturbance  Goal: Will exhibit normal sleeping pattern  Description: INTERVENTIONS:  1. Administer medication as ordered  2. Decrease environmental stimuli, including noise, as appropriate  3. Discourage social isolation and naps during the day  Outcome: Progressing

## 2024-07-12 NOTE — PROGRESS NOTES
Pt transferred back to [facility] at this time. She is A&O X4 calm, brighter and cooperative at this time. Report given to SHIRLEY Prieto. All belongings and paperwork given to staff.

## 2024-07-12 NOTE — PROGRESS NOTES
Alisson Lee  Neurology Follow-up  [facility] Neurology    Date of Service: 7/12/2024    Subjective:   CC: Follow up today regarding:     Events noted. Chart and lab reviewed. No acute events overnight.       ROS : A 10-12 system review obtained and updated today and is unremarkable except as mentioned  in my interval history.     Past medical history, social history, medication and family history reviewed.       Objective:  Exam:   Constitutional:   Vitals:    07/12/24 0900 07/12/24 1000 07/12/24 1045 07/12/24 1100   BP: 103/67 119/79 103/64 105/64   Pulse: 74 95 78 81   Resp: 20 16  21   Temp:       TempSrc:       SpO2: 97% 98%  100%   Weight:       Height:         General appearance:  Normal development and appear in no acute distress.   Mental Status:   Oriented to person, place, problem, and time.    Memory: Good immediate recall.  Intact remote memory  Normal attention span and concentration.  Language: intact naming, repeating and fluency   Good fund of Knowledge. Aware of current events and vocabulary   Cranial Nerves:   II: Visual fields: Full. Pupils: equal, round, reactive to light, bilaterally  III,IV,VI: Extra Ocular Movements are intact. No nystagmus  V: Facial sensation is intact  VII: Facial strength and movements: intact and symmetric  IX: Normal palatal elevation and shoulder shrug  XII: Tongue movements are normal  Musculoskeletal: 5/5 in all 4 extremities.  Good range of motion.  No muscle atrophy.    Tone: Normal tone.   Reflexes: Symmetric 2+ in the arms and 2+ in the legs   Planters: Flexor bilaterally  Coordination: no pronator drift, no dysmetria with FNF.   Sensation: normal in both arms and legs.  Gait/Posture: steady gait with normal posturing and station.    MDM:        A. Problems (any 1)     High:     [] Acute/Chronic Illness/injury posing threat to life or bodily function:    [] Severe exacerbation of chronic illness:       Moderate:     []     1 or more chronic illness with  patient      Impression:  Syncope    EEG revealed: no evidence of seizure tendency.     Recommendation  Neurology recommends follow-up outpatient for possible EEG monitoring for spell classification in UC as this cannot completely exclude epileptic disorder.     There is no need to start antiseizure medication from neurology point of view at this time.    The patient may return to behavioral health unit.        ESTEBAN Peraza NP      This dictation was generated by voice recognition computer software. Although all attempts are made to edit the dictation for accuracy, there may be errors in the transcription that are not intended.     Addendum:  Neurology will sign off.

## 2024-07-12 NOTE — PROGRESS NOTES
Pt awake and eating breakfast. Reports improved appetite and better sleep last night. Pt denies current SI/HI/VH/AH and agrees to communicate with staff if no longer feel safe or in control. She still reports that she is not sure she can remain safe at home. Pt states she is still feeling hopeless and sad. She is having racing thoughts denies they are command and reports it mostly related to her ADHD and not being stimulated in here enough. At home she works a lot so this helps her stay busy and decreases the racing thoughts.  Requesting a journal and safety pen to help ease her mind. Denies dizziness/headache a this time. Still requesting ibuprofen for HA pain instead of tylenol. Pt also reports SOB at times and feeling like she cannot catch her breath. Lungs are clear r/r are even and easy at this time.

## 2024-07-12 NOTE — BH NOTE
CSSR-S Assessment completed with patient who then scored HIGH RISK.     Provider Dr. Alfred was notified of HIGH RISK score, via Telephone at 1815.      Were suicide precautions ordered: NO    If not ordered, justification as follows: Patient verbalizes understanding and agreement of safe behavior. Is able to communicate effectively and appropriately and verbally agrees to notify staff immediately if has thoughts of suicide/self harm and intentions of acting on them.     Completed by: LALIT Lino RN

## 2024-07-12 NOTE — PLAN OF CARE
Problem: Discharge Planning  Goal: Discharge to home or other facility with appropriate resources  7/11/2024 2303 by La Nena Fragoso RN  Outcome: Progressing  7/11/2024 1505 by Radha George RN  Outcome: Progressing     Problem: Self Harm/Suicidality  Goal: Will have no self-injury during hospital stay  Description: INTERVENTIONS:  1.  Ensure constant observer at bedside with Q15M safety checks  2.  Maintain a safe environment  3.  Secure patient belongings  4.  Ensure family/visitors adhere to safety recommendations  5.  Ensure safety tray has been added to patient's diet order  6.  Every shift and PRN: Re-assess suicidal risk via Frequent Screener    7/11/2024 2303 by La Nena Fragoso RN  Outcome: Progressing  7/11/2024 1505 by Radha George RN  Outcome: Progressing     Problem: Risk for Elopement  Goal: Patient will not exit the unit/facility without proper excort  Outcome: Progressing     Problem: Pain  Goal: Verbalizes/displays adequate comfort level or baseline comfort level  7/11/2024 2303 by La Nena Fragoso RN  Outcome: Progressing  7/11/2024 1505 by Radha George, RN  Outcome: Progressing

## 2024-07-12 NOTE — PROGRESS NOTES
Pt is laying in bed resting so far this shift. She is A&O X4 calm, cooperative and pleasant. Discussed event leading up to her being transferred to ICU and patient is not able to recall much. She does report frequent similar episodes. Currently denying pain/headaches. Encouraged to eat breakfast but patient states she is tired and just wants to rest. 1:1 in place for suicide precautions, sitter at bedside.

## 2024-07-12 NOTE — PLAN OF CARE
Problem: Self Harm/Suicidality  Goal: Will have no self-injury during hospital stay  Description: INTERVENTIONS:  1.  Ensure constant observer at bedside with Q15M safety checks  2.  Maintain a safe environment  3.  Secure patient belongings  4.  Ensure family/visitors adhere to safety recommendations  5.  Ensure safety tray has been added to patient's diet order  6.  Every shift and PRN: Re-assess suicidal risk via Frequent Screener    Outcome: Progressing     Problem: Anxiety  Goal: Will report anxiety at manageable levels  Description: INTERVENTIONS:  1. Administer medication as ordered  2. Teach and rehearse alternative coping skills  3. Provide emotional support with 1:1 interaction with staff  Outcome: Progressing     Problem: Depression  Goal: Will be euthymic at discharge  Description: INTERVENTIONS:  1. Administer medication as ordered  2. Provide emotional support via 1:1 interaction with staff  3. Encourage involvement in milieu/groups/activities  4. Monitor for social isolation  Outcome: Progressing     Problem: Behavior  Goal: Pt/Family maintain appropriate behavior and adhere to behavioral management agreement, if implemented  Description: INTERVENTIONS:  1. Assess patient/family's coping skills and  non-compliant behavior (including use of illegal substances)  2. Notify security of behavior or suspected illegal substances which indicate the need for search of the family and/or belongings  3. Encourage verbalization of thoughts and concerns in a socially appropriate manner  4. Utilize positive, consistent limit setting strategies supporting safety of patient, staff and others  5. Encourage participation in the decision making process about the behavioral management agreement  6. If a visitor's behavior poses a threat to safety call refer to organization policy.  7. Initiate consult with , Psychosocial CNS, Spiritual Care as appropriate  Outcome: Progressing

## 2024-07-13 PROBLEM — F32.2 CURRENT SEVERE EPISODE OF MAJOR DEPRESSIVE DISORDER WITHOUT PSYCHOTIC FEATURES (HCC): Status: ACTIVE | Noted: 2024-07-13

## 2024-07-13 PROBLEM — F44.4 FUNCTIONAL NEUROLOGICAL SYMPTOM DISORDER (CONVERSION DISORDER), WITH ABNORMAL MOVEMENT: Status: ACTIVE | Noted: 2024-07-13

## 2024-07-13 LAB
CHOLEST SERPL-MCNC: 142 MG/DL (ref 0–199)
EST. AVERAGE GLUCOSE BLD GHB EST-MCNC: 96.8 MG/DL
HBA1C MFR BLD: 5 %
HDLC SERPL-MCNC: 42 MG/DL (ref 40–60)
LDLC SERPL CALC-MCNC: 80 MG/DL
TRIGL SERPL-MCNC: 99 MG/DL (ref 0–150)
TSH SERPL DL<=0.005 MIU/L-ACNC: 1.65 UIU/ML (ref 0.27–4.2)
VLDLC SERPL CALC-MCNC: 20 MG/DL

## 2024-07-13 PROCEDURE — 36415 COLL VENOUS BLD VENIPUNCTURE: CPT

## 2024-07-13 PROCEDURE — 6370000000 HC RX 637 (ALT 250 FOR IP): Performed by: INTERNAL MEDICINE

## 2024-07-13 PROCEDURE — 80061 LIPID PANEL: CPT

## 2024-07-13 PROCEDURE — 84443 ASSAY THYROID STIM HORMONE: CPT

## 2024-07-13 PROCEDURE — 83036 HEMOGLOBIN GLYCOSYLATED A1C: CPT

## 2024-07-13 PROCEDURE — 6370000000 HC RX 637 (ALT 250 FOR IP): Performed by: PSYCHIATRY & NEUROLOGY

## 2024-07-13 PROCEDURE — 1240000000 HC EMOTIONAL WELLNESS R&B

## 2024-07-13 RX ADMIN — ESCITALOPRAM OXALATE 10 MG: 10 TABLET ORAL at 12:27

## 2024-07-13 RX ADMIN — ACETAMINOPHEN 650 MG: 325 TABLET ORAL at 12:30

## 2024-07-13 RX ADMIN — IBUPROFEN 400 MG: 400 TABLET, FILM COATED ORAL at 07:54

## 2024-07-13 RX ADMIN — TRAZODONE HYDROCHLORIDE 50 MG: 50 TABLET ORAL at 21:29

## 2024-07-13 ASSESSMENT — PAIN DESCRIPTION - DESCRIPTORS: DESCRIPTORS: ACHING

## 2024-07-13 ASSESSMENT — PAIN SCALES - GENERAL
PAINLEVEL_OUTOF10: 8
PAINLEVEL_OUTOF10: 4

## 2024-07-13 ASSESSMENT — PAIN DESCRIPTION - LOCATION: LOCATION: HEAD

## 2024-07-13 NOTE — PLAN OF CARE
Pt has been isolative to room but calm, cooperative, and friendly upon approach. Pt reported to writer being sad and depressed but did brighten during interaction. Pt stated she feels good today after being admitted back on this unit and getting a shower for the first time in a few days. Pt was compliant with scheduled medication and denied SI/HI/AVH. Pt slept well.          Problem: Self Harm/Suicidality  Goal: Will have no self-injury during hospital stay  Description: INTERVENTIONS:  1.  Ensure constant observer at bedside with Q15M safety checks  2.  Maintain a safe environment  3.  Secure patient belongings  4.  Ensure family/visitors adhere to safety recommendations  5.  Ensure safety tray has been added to patient's diet order  6.  Every shift and PRN: Re-assess suicidal risk via Frequent Screener  Outcome: Progressing     Problem: Anxiety  Goal: Will report anxiety at manageable levels  Description: INTERVENTIONS:  1. Administer medication as ordered  2. Teach and rehearse alternative coping skills  3. Provide emotional support with 1:1 interaction with staff  Outcome: Progressing    Problem: Depression  Goal: Will be euthymic at discharge  Description: INTERVENTIONS:  1. Administer medication as ordered  2. Provide emotional support via 1:1 interaction with staff  3. Encourage involvement in milieu/groups/activities  4. Monitor for social isolation  Outcome: Progressing     Problem: Behavior  Goal: Pt/Family maintain appropriate behavior and adhere to behavioral management agreement, if implemented  Description: INTERVENTIONS:  1. Assess patient/family's coping skills and  non-compliant behavior (including use of illegal substances)  2. Notify security of behavior or suspected illegal substances which indicate the need for search of the family and/or belongings  3. Encourage verbalization of thoughts and concerns in a socially appropriate manner  4. Utilize positive, consistent limit setting strategies  supporting safety of patient, staff and others  5. Encourage participation in the decision making process about the behavioral management agreement  6. If a visitor's behavior poses a threat to safety call refer to organization policy.  7. Initiate consult with , Psychosocial CNS, Spiritual Care as appropriate  Outcome: Progressing     Problem: Safety - Adult  Goal: Free from fall injury  Outcome: Progressing

## 2024-07-13 NOTE — PLAN OF CARE
Problem: Self Harm/Suicidality  Goal: Will have no self-injury during hospital stay  Description: INTERVENTIONS:  1.  Ensure constant observer at bedside with Q15M safety checks  2.  Maintain a safe environment  3.  Secure patient belongings  4.  Ensure family/visitors adhere to safety recommendations  5.  Ensure safety tray has been added to patient's diet order  6.  Every shift and PRN: Re-assess suicidal risk via Frequent Screener    Outcome: Progressing     Problem: Anxiety  Goal: Will report anxiety at manageable levels  Description: INTERVENTIONS:  1. Administer medication as ordered  2. Teach and rehearse alternative coping skills  3. Provide emotional support with 1:1 interaction with staff  Outcome: Progressing  Flowsheets (Taken 7/13/2024 1258)  Will report anxiety at manageable levels:   Teach and rehearse alternative coping skills   Provide emotional support with 1:1 interaction with staff     Problem: Depression  Goal: Will be euthymic at discharge  Description: INTERVENTIONS:  1. Administer medication as ordered  2. Provide emotional support via 1:1 interaction with staff  3. Encourage involvement in milieu/groups/activities  4. Monitor for social isolation  Outcome: Progressing     Problem: Behavior  Goal: Pt/Family maintain appropriate behavior and adhere to behavioral management agreement, if implemented  Description: INTERVENTIONS:  1. Assess patient/family's coping skills and  non-compliant behavior (including use of illegal substances)  2. Notify security of behavior or suspected illegal substances which indicate the need for search of the family and/or belongings  3. Encourage verbalization of thoughts and concerns in a socially appropriate manner  4. Utilize positive, consistent limit setting strategies supporting safety of patient, staff and others  5. Encourage participation in the decision making process about the behavioral management agreement  6. If a visitor's behavior poses a threat

## 2024-07-13 NOTE — PROGRESS NOTES
4 Eyes Skin Assessment     The patient is being assessed for  Admission    I agree that 2 RN's have performed a thorough Head to Toe Skin Assessment on the patient. ALL assessment sites listed below have been assessed.       Areas assessed for pressure by both nurses:   [x]   Head, Face, and Ears   [x]   Shoulders, Back, and Chest  [x]   Arms, Elbows, and Hands   [x]   Coccyx, Sacrum, and Ischum  [x]   Legs, Feet, and Heels                                Skin Assessed Under all Medical Devices by both nurses: no medical devices             All Mepilex Borders were peeled back and area peeked at by both nurses: no mepilex borders                 Does the Patient have Skin Breakdown related to pressure?  No            Tigre Prevention initiated:  NA   Wound Care Orders initiated:  NA      WOC nurse consulted for Pressure Injury (Stage 3,4, Unstageable, DTI, NWPT, Complex wounds)and New or Established Ostomies:  no pressure injuries or ostomies      Nurse 1 eSignature: Electronically signed by Mary Kate Roche RN on 7/12/24 at 10:27 PM EDT    **SHARE this note so that the co-signing nurse is able to place an eSignature**    Nurse 2 eSignature: Electronically signed by Marcy Mcqueen RN on 7/12/24 at 10:47 PM EDT

## 2024-07-13 NOTE — H&P
Patient was on BHI.  See H&P 7/6/24.  Then transferred to medical for syncope. Admitted to [facility] 7/10/24-7/12/24.  Medically cleared to be transferred to [facility].    Assessment/Plan  Syncope   - has followed with neurology in the past  - +orthostatic blood pressure post syncopal episode from laying and sitting--unable to stand per report  - given hx I suspect psychogenic origin as pt wrote in the journal that she is going to pass out and multiple previous episodes also with stress related     - neurology consulted  - checked orthostatic blood pressure and was normal here   - no arrhythmia or hypotension noted since admission   - ct head neg, EEG neg   - neurology recommends outpt follow up for further testing        Depression  SI  - management per psychiatry.     Please notify medical provider for any acute needs/concerns.    Edwige Johnson FNP-C  7/13/2024

## 2024-07-13 NOTE — PROGRESS NOTES
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), with abnormal movement     Psychogenic syncope        Status EXAM:  Mental Status and Behavioral Exam  Normal: No  Level of Assistance: Independent/Self  Facial Expression: Worried  Affect: Congruent  Level of Consciousness: Alert  Frequency of Checks: 4 times per hour, close  Mood:Normal: No  Mood: Anxious, Sad  Motor Activity:Normal: Yes  Motor Activity: Other (comment) (WNL)  Eye Contact: Good  Observed Behavior: Friendly, Cooperative  Sexual Misconduct History: Current - no  Preception: [place] to person, [place] to time, [place] to place, [place] to situation  Attention:Normal: Yes  Attention: Distractible  Thought Processes: Unremarkable  Thought Content:Normal: No  Thought Content: Poverty of content, Paranoia  Depression Symptoms: Appetite change, Change in energy level, Feelings of helplessness, Feelings of hopelessess, Feelings of worthlessness  Anxiety Symptoms: Generalized  [place] Symptoms: No problems reported or observed.  Hallucinations: None  Delusions: No  Memory:Normal: Yes  Memory: Other (comment) (WNL)  Insight and Judgment: No  Insight and Judgment: Poor judgment, Poor insight    Tobacco Screening:  Practical Counseling, on admission, yury X, if applicable and completed (first 3 are required if patient doesn't refuse):            ( ) Recognizing danger situations (included triggers and roadblocks)                    ( ) Coping skills (new ways to manage stress,relaxation techniques, changing routine, distraction)

## 2024-07-13 NOTE — H&P
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), with abnormal movement 07/13/2024    Current severe episode of major depressive disorder without psychotic features (HCC) 07/13/2024    Major depression, recurrent (HCC) 07/12/2024    Psychogenic syncope 07/10/2024    Syncope and collapse 07/10/2024    Orthostatic hypotension 07/10/2024    Severe episode of recurrent major depressive disorder, without psychotic features (HCC) 07/05/2024    And Present on Admission:   Major depression, recurrent (HCC)   Psychogenic syncope   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  All conditions present on admission are being treated while pt is hospitalized.   Discussed PHP after discharge as part of transition back to the community.     Medications  Current Facility-Administered Medications   Medication Dose Route Frequency Provider Last Rate Last Admin    traZODone (DESYREL) tablet 50 mg  50 mg Oral Nightly Hussain Alfred MD   50 mg at 07/12/24 2156    acetaminophen (TYLENOL) tablet 650 mg  650 mg Oral Q4H PRN Hussain Alfred MD   650 mg at 07/13/24 1230    ibuprofen (ADVIL;MOTRIN) tablet 400 mg  400 mg Oral Q6H PRN Hussain Alfred MD   400 mg at 07/13/24 0754    magnesium hydroxide (MILK OF MAGNESIA) 400 MG/5ML suspension 30 mL  30 mL Oral Daily PRN Hussain Alfred MD        nicotine (NICODERM CQ) 21 MG/24HR 1 patch  1 patch TransDERmal Daily Hussain Alfred MD        nicotine polacrilex (COMMIT) lozenge 2 mg  2 mg Oral Q1H PRN Hussain Alfred MD        aluminum & magnesium hydroxide-simethicone (MAALOX) 200-200-20 MG/5ML suspension 30 mL  30 mL Oral Q6H PRN Hussain Alfred MD        OLANZapine (ZYPREXA) tablet 10 mg  10 mg Oral Q4H PRN Hussain Alfred MD        Or    OLANZapine (ZyPREXA) 10 mg in sterile water 2 mL injection  10 mg IntraMUSCular Q4H PRN Hussain Alfred MD        benztropine mesylate (COGENTIN) injection 2 mg  2 mg IntraMUSCular BID PRN Hussain Alfred MD        traZODone (DESYREL) tablet 50 mg  50 mg Oral Nightly PRN Hussain Alfred MD        hydrOXYzine HCl (ATARAX) tablet 25 mg  25 mg Oral Q6H PRN Hussain Alfred MD        escitalopram (LEXAPRO) tablet 10 mg  10 mg Oral Daily Jairon Lopez MD   10 mg at 07/13/24 1227    hydrOXYzine HCl (ATARAX) tablet 50 mg  50 mg Oral TID PRN Jairon Lopez MD        ondansetron (ZOFRAN-ODT) disintegrating tablet 4 mg  4 mg Oral

## 2024-07-14 PROCEDURE — 6370000000 HC RX 637 (ALT 250 FOR IP): Performed by: INTERNAL MEDICINE

## 2024-07-14 PROCEDURE — 1240000000 HC EMOTIONAL WELLNESS R&B

## 2024-07-14 PROCEDURE — 6370000000 HC RX 637 (ALT 250 FOR IP): Performed by: PSYCHIATRY & NEUROLOGY

## 2024-07-14 RX ADMIN — ESCITALOPRAM OXALATE 10 MG: 10 TABLET ORAL at 09:07

## 2024-07-14 RX ADMIN — TRAZODONE HYDROCHLORIDE 50 MG: 50 TABLET ORAL at 21:04

## 2024-07-14 NOTE — PLAN OF CARE
Problem: Self Harm/Suicidality  Goal: Will have no self-injury during hospital stay  Description: INTERVENTIONS:  1.  Ensure constant observer at bedside with Q15M safety checks  2.  Maintain a safe environment  3.  Secure patient belongings  4.  Ensure family/visitors adhere to safety recommendations  5.  Ensure safety tray has been added to patient's diet order  6.  Every shift and PRN: Re-assess suicidal risk via Frequent Screener    Outcome: Progressing     Problem: Anxiety  Goal: Will report anxiety at manageable levels  Description: INTERVENTIONS:  1. Administer medication as ordered  2. Teach and rehearse alternative coping skills  3. Provide emotional support with 1:1 interaction with staff  Outcome: Progressing  Flowsheets (Taken 7/14/2024 0925)  Will report anxiety at manageable levels: Teach and rehearse alternative coping skills     Problem: Depression  Goal: Will be euthymic at discharge  Description: INTERVENTIONS:  1. Administer medication as ordered  2. Provide emotional support via 1:1 interaction with staff  3. Encourage involvement in milieu/groups/activities  4. Monitor for social isolation  7/14/2024 0041 by Lizzie Duque RN  Outcome: Progressing

## 2024-07-14 NOTE — PLAN OF CARE
Alisson has been withdrawn to room but was out for snack and medication. Patient endorses suicidal thoughts but denies plan or intent and reports she feels safe while here. Patient has been writing in her journal which she states is helpful. Patient engages in conversation easily and brightens on approach. Alisson denies HI/A/VH.   Problem: Depression  Goal: Will be euthymic at discharge  Description: INTERVENTIONS:  1. Administer medication as ordered  2. Provide emotional support via 1:1 interaction with staff  3. Encourage involvement in milieu/groups/activities  4. Monitor for social isolation  7/14/2024 0041 by Lizzie Duque, RN  Outcome: Progressing

## 2024-07-15 PROCEDURE — 6370000000 HC RX 637 (ALT 250 FOR IP): Performed by: PSYCHIATRY & NEUROLOGY

## 2024-07-15 PROCEDURE — 6370000000 HC RX 637 (ALT 250 FOR IP): Performed by: INTERNAL MEDICINE

## 2024-07-15 PROCEDURE — 1240000000 HC EMOTIONAL WELLNESS R&B

## 2024-07-15 RX ADMIN — TRAZODONE HYDROCHLORIDE 50 MG: 50 TABLET ORAL at 22:22

## 2024-07-15 RX ADMIN — IBUPROFEN 400 MG: 400 TABLET, FILM COATED ORAL at 22:26

## 2024-07-15 RX ADMIN — ESCITALOPRAM OXALATE 10 MG: 10 TABLET ORAL at 08:21

## 2024-07-15 ASSESSMENT — PAIN SCALES - GENERAL
PAINLEVEL_OUTOF10: 4
PAINLEVEL_OUTOF10: 4
PAINLEVEL_OUTOF10: 0
PAINLEVEL_OUTOF10: 0

## 2024-07-15 NOTE — PROGRESS NOTES
Patient was pleasant and cooperative on approach, friendly with staff and would brighten during conversation. Visible on unit but isolated to self. Spent evening writing in journal.  Denies SI/HI/AVH currently but stated that if she wasn't here she would want to harm herself. No pain or anxiety noted. No other needs at this time, will continue to monitor.

## 2024-07-15 NOTE — PLAN OF CARE
Problem: Self Harm/Suicidality  Goal: Will have no self-injury during hospital stay  Description: INTERVENTIONS:  1.  Ensure constant observer at bedside with Q15M safety checks  2.  Maintain a safe environment  3.  Secure patient belongings  4.  Ensure family/visitors adhere to safety recommendations  5.  Ensure safety tray has been added to patient's diet order  6.  Every shift and PRN: Re-assess suicidal risk via Frequent Screener    7/14/2024 2235 by Dayanara Jordan LPN  Outcome: Progressing     Problem: Anxiety  Goal: Will report anxiety at manageable levels  Description: INTERVENTIONS:  1. Administer medication as ordered  2. Teach and rehearse alternative coping skills  3. Provide emotional support with 1:1 interaction with staff  7/14/2024 2235 by Dayanara Jordan LPN  Outcome: Progressing     Problem: Behavior  Goal: Pt/Family maintain appropriate behavior and adhere to behavioral management agreement, if implemented  Description: INTERVENTIONS:  1. Assess patient/family's coping skills and  non-compliant behavior (including use of illegal substances)  2. Notify security of behavior or suspected illegal substances which indicate the need for search of the family and/or belongings  3. Encourage verbalization of thoughts and concerns in a socially appropriate manner  4. Utilize positive, consistent limit setting strategies supporting safety of patient, staff and others  5. Encourage participation in the decision making process about the behavioral management agreement  6. If a visitor's behavior poses a threat to safety call refer to organization policy.  7. Initiate consult with , Psychosocial CNS, Spiritual Care as appropriate  Outcome: Progressing

## 2024-07-15 NOTE — PLAN OF CARE
Patient states that she woke up and was \"almost crying\" due to feeling like \"I have emotions instead of feeling numb I think the Lexapro is working\". Patient appears to be hopeful that she will \"get better\" with the medication.   Problem: Self Harm/Suicidality  Goal: Will have no self-injury during hospital stay  Description: INTERVENTIONS:  1.  Ensure constant observer at bedside with Q15M safety checks  2.  Maintain a safe environment  3.  Secure patient belongings  4.  Ensure family/visitors adhere to safety recommendations  5.  Ensure safety tray has been added to patient's diet order  6.  Every shift and PRN: Re-assess suicidal risk via Frequent Screener    7/15/2024 0841 by Jessica Larson RN  Outcome: Progressing  7/14/2024 2235 by Dayanara Jordan LPN  Outcome: Progressing     Problem: Anxiety  Goal: Will report anxiety at manageable levels  Description: INTERVENTIONS:  1. Administer medication as ordered  2. Teach and rehearse alternative coping skills  3. Provide emotional support with 1:1 interaction with staff  7/15/2024 0841 by Jessica Larson RN  Outcome: Progressing  Flowsheets (Taken 7/15/2024 0838)  Will report anxiety at manageable levels: Teach and rehearse alternative coping skills  7/14/2024 2235 by Dayanara Jordan LPN  Outcome: Progressing     Problem: Depression  Goal: Will be euthymic at discharge  Description: INTERVENTIONS:  1. Administer medication as ordered  2. Provide emotional support via 1:1 interaction with staff  3. Encourage involvement in milieu/groups/activities  4. Monitor for social isolation  Outcome: Progressing     Problem: Behavior  Goal: Pt/Family maintain appropriate behavior and adhere to behavioral management agreement, if implemented  Description: INTERVENTIONS:  1. Assess patient/family's coping skills and  non-compliant behavior (including use of illegal substances)  2. Notify security of behavior or suspected illegal substances which indicate the need for

## 2024-07-15 NOTE — GROUP NOTE
Group Therapy Note    Date: 7/15/2024    Group Start Time: 1115  Group End Time: 1200  Group Topic: Cognitive Skills    [facility]    Phuong Joyner LPC    Participants engaged in group to explore communication styles. Participants learned about the differences of passive, aggressive, passive-aggressive and assertive communication. Group members shared personal connections to communication styles and practiced how to become more assertive. Participants identified importance of non-verbal cues, body language, and tone of voice while communicating with others.     Group Therapy Note    Attendees: 10         Notes:  Alisson engaged throughout the group. She was able to share personal insight to communication styles and shared what she felt her own personal style was. Alisson engaged in role-playing communication scenarios.     Status After Intervention:  Improved    Participation Level: Active Listener and Interactive    Participation Quality: Appropriate, Attentive, Sharing, and Supportive      Speech:  normal      Thought Process/Content: Logical      Affective Functioning: Congruent      Mood: euthymic      Level of consciousness:  Alert      Response to Learning: Able to verbalize current knowledge/experience, Able to verbalize/acknowledge new learning, Able to retain information, and Capable of insight      Endings: None Reported    Modes of Intervention: Education, Support, Socialization, and Exploration      Discipline Responsible: /Counselor      Signature:  Phuong Joyner LPC

## 2024-07-15 NOTE — GROUP NOTE
Group Therapy Note    Date: 7/15/2024    Group Start Time: 1000  Group End Time: 1045  Group Topic: Cognitive Skills    [facility]    Phuong Topete        Group Therapy Note    Attendees: 11    Group members engaged in the exercise \"Creating Connections.\" Group members were given a ball of yarn and were given various prompts. With the yarn, group members created a spider web that was known as the connection. The goal of the group was to show people the similarities and differences of one another, but they can still be connected even with differences.     Notes:  Patient attended group for the full duration. Patient remained engaged and interacted appropriately with other members of the group.     Status After Intervention:  Improved    Participation Level: Active Listener and Interactive    Participation Quality: Appropriate      Speech:  normal      Thought Process/Content: Logical      Affective Functioning: Congruent      Mood: euthymic      Level of consciousness:  Alert, Oriented x4, and Attentive      Response to Learning: Able to verbalize current knowledge/experience      Endings: None Reported    Modes of Intervention: Socialization, Exploration, and Activity      Discipline Responsible: Behavorial Health Tech      Signature:  ANTONI IZAGUIRRE

## 2024-07-15 NOTE — GROUP NOTE
Group Therapy Note    Date: 7/15/2024    Group Start Time: 1615  Group End Time: 1700  Group Topic: Cognitive Skills    [facility]    Phuong Joyner LPC    Patients engaged throughout the group to explore their silver lining/positive moment for the day. They were able to discuss possible changes they'd like to make and identified where they fall on the \"stages of change'. Patients discussed various coping skills that can support desired changes.     Group Therapy Note    Attendees: 6       Notes:  Alisson engaged in the group. Alisson shared that she feels her medication is finally working and that she'd like to work on communication skills. Alisson was talkative and engaged group members appropriately. Alisson was able to identify different coping skills.    Status After Intervention:  Improved    Participation Level: Active Listener and Interactive    Participation Quality: Appropriate, Attentive, Sharing, and Supportive      Speech:  normal      Thought Process/Content: Logical      Affective Functioning: Congruent      Mood: euthymic      Level of consciousness:  Alert and Oriented x4      Response to Learning: Able to verbalize current knowledge/experience, Able to verbalize/acknowledge new learning, Able to retain information, and Capable of insight      Endings: None Reported    Modes of Intervention: Education, Support, Socialization, and Exploration      Discipline Responsible: /Counselor      Signature:  Phuong Joyner LPC

## 2024-07-15 NOTE — PLAN OF CARE
[facility]  Treatment Team Note  Review Date & Time: 07/15/24  0950    Patient was not in treatment team      Status EXAM:   Mental Status and Behavioral Exam  Normal: No  Level of Assistance: Independent/Self  Facial Expression: Sad  Affect: Congruent  Level of Consciousness: Alert  Frequency of Checks: 4 times per hour, close  Mood:Normal: No  Mood: Anxious, Sad  Motor Activity:Normal: No  Motor Activity: Decreased  Eye Contact: Good  Observed Behavior: Friendly, Cooperative  Sexual Misconduct History: Current - no  Preception: [place] to person, [place] to time, [place] to place, [place] to situation  Attention:Normal: Yes  Attention: Distractible  Thought Processes: Unremarkable  Thought Content:Normal: No  Thought Content: Poverty of content  Depression Symptoms: Isolative, Feelings of helplessness  Anxiety Symptoms: Generalized  [place] Symptoms: No problems reported or observed.  Hallucinations: None  Delusions: No  Memory:Normal: Yes  Memory:  (WNL)  Insight and Judgment: No  Insight and Judgment: Poor judgment, Poor insight      Suicide Risk CSSR-S:  1) Within the past month, have you wished you were dead or wished you could go to sleep and not wake up? : Yes  2) Have you actually had any thoughts of killing yourself? : Yes  3) Have you been thinking about how you might kill yourself? : Yes  5) Have you started to work out or worked out the details of how to kill yourself? Do you intend to carry out this plan? : Yes  6) Have you ever done anything, started to do anything, or prepared to do anything to end your life?: Yes      PLAN/TREATMENT RECOMMENDATIONS UPDATE:   Patient will take medication as prescribed, eat 75% of meals, attend groups, participate in milieu activities, participate in treatment team and care planning for discharge and follow up.           Laurie Gallo RN

## 2024-07-16 VITALS
BODY MASS INDEX: 20.2 KG/M2 | SYSTOLIC BLOOD PRESSURE: 98 MMHG | OXYGEN SATURATION: 97 % | WEIGHT: 114 LBS | DIASTOLIC BLOOD PRESSURE: 63 MMHG | HEART RATE: 102 BPM | RESPIRATION RATE: 16 BRPM | TEMPERATURE: 98.1 F | HEIGHT: 63 IN

## 2024-07-16 PROCEDURE — 5130000000 HC BRIDGE APPOINTMENT

## 2024-07-16 PROCEDURE — 6370000000 HC RX 637 (ALT 250 FOR IP): Performed by: INTERNAL MEDICINE

## 2024-07-16 PROCEDURE — 6370000000 HC RX 637 (ALT 250 FOR IP): Performed by: PSYCHIATRY & NEUROLOGY

## 2024-07-16 RX ORDER — HYDROXYZINE HYDROCHLORIDE 25 MG/1
25 TABLET, FILM COATED ORAL EVERY 6 HOURS PRN
Status: DISCONTINUED | OUTPATIENT
Start: 2024-07-16 | End: 2024-07-16 | Stop reason: HOSPADM

## 2024-07-16 RX ORDER — ESCITALOPRAM OXALATE 10 MG/1
10 TABLET ORAL DAILY
Qty: 30 TABLET | Refills: 0 | Status: SHIPPED | OUTPATIENT
Start: 2024-07-17

## 2024-07-16 RX ORDER — TRAZODONE HYDROCHLORIDE 50 MG/1
50 TABLET ORAL NIGHTLY PRN
Qty: 20 TABLET | Refills: 0 | Status: SHIPPED | OUTPATIENT
Start: 2024-07-16

## 2024-07-16 RX ORDER — ACETAMINOPHEN 325 MG/1
650 TABLET ORAL EVERY 8 HOURS PRN
Status: DISCONTINUED | OUTPATIENT
Start: 2024-07-16 | End: 2024-07-16 | Stop reason: HOSPADM

## 2024-07-16 RX ADMIN — ACETAMINOPHEN 650 MG: 325 TABLET ORAL at 10:57

## 2024-07-16 RX ADMIN — ESCITALOPRAM OXALATE 10 MG: 10 TABLET ORAL at 08:20

## 2024-07-16 ASSESSMENT — LIFESTYLE VARIABLES
HOW OFTEN DO YOU HAVE A DRINK CONTAINING ALCOHOL: NEVER
HOW MANY STANDARD DRINKS CONTAINING ALCOHOL DO YOU HAVE ON A TYPICAL DAY: PATIENT DOES NOT DRINK

## 2024-07-16 ASSESSMENT — PAIN DESCRIPTION - LOCATION: LOCATION: HEAD

## 2024-07-16 ASSESSMENT — PAIN SCALES - GENERAL
PAINLEVEL_OUTOF10: 0
PAINLEVEL_OUTOF10: 7

## 2024-07-16 NOTE — PROGRESS NOTES
[facility]  Discharge Note    Pt discharged with followings belongings:   Dental Appliances: None  Vision - Corrective Lenses: None  Hearing Aid: None  Jewelry: Necklace, Other (Comment) (in unit safe)  Body Piercings Removed: No  Clothing: Footwear, Pants, Shirt, Socks, Other (Comment) (in locker)  Other Valuables: Wallet, Keys, Credit/Debit Card, Money (unit safe)   Valuables sent home with Yes or returned to patient. Patient educated on aftercare instructions: Yes  .Patient verbalize understanding of AVS:  Yes.    Status EXAM upon discharge:  Mental Status and Behavioral Exam  Normal: No  Level of Assistance: Independent/Self  Facial Expression: Sad, Worried  Affect: Congruent  Level of Consciousness: Alert  Frequency of Checks: 4 times per hour, close  Mood:Normal: No  Mood: Anxious  Motor Activity:Normal: No  Motor Activity: Decreased  Eye Contact: Good  Observed Behavior: Cooperative, Friendly  Sexual Misconduct History: Current - no  Preception: [place] to time, [place] to person, [place] to place, [place] to situation  Attention:Normal: Yes  Attention: Distractible  Thought Processes: Unremarkable  Thought Content:Normal: No  Thought Content: Poverty of content  Depression Symptoms: Isolative, Feelings of helplessness  Anxiety Symptoms: Generalized  [place] Symptoms: No problems reported or observed.  Hallucinations: None  Delusions: No  Memory:Normal: Yes  Memory: Other (comment) (WNL)  Insight and Judgment: No  Insight and Judgment: Poor judgment, Poor insight    Tobacco Screening:  Practical Counseling, on admission, yury X, if applicable and completed (first 3 are required if patient doesn't refuse):            ( ) Recognizing danger situations (included triggers and roadblocks)                    ( ) Coping skills (new ways to manage stress,relaxation techniques, changing routine, distraction)                                                           ( ) Basic information about quitting  (benefits of quitting, techniques in how to quit, available resources  ( ) [facility]                                                                                                                   ( ) Patient refused counseling  ( ) Patient refused referral  ( ) Patient refused prescription upon discharge  ( X) Patient has not smoked in the last 30 days    Metabolic Screening:    Lab Results   Component Value Date    LABA1C 5.0 07/13/2024       Lab Results   Component Value Date    CHOL 142 07/13/2024     Lab Results   Component Value Date    TRIG 99 07/13/2024     Lab Results   Component Value Date    HDL 42 07/13/2024     No components found for: \"LDLCAL\"  No components found for: \"LABVLDL\"    Jessica Larson RN

## 2024-07-16 NOTE — TRANSITION OF CARE
[facility]al [facility] Transition Record    Patient Name: Alisson Lee  YOB: 2005   Medical Record Number: [id number]  Date of Admission: 7/12/2024  5:44 PM   Date of Discharge: 7/16/2024    Attending Provider: Severiano Cho MD   Discharging Provider:  Severiano Cho MD   To contact this individual call [phone] and ask the  to page.  If unavailable, ask to be transferred to Behavioral Health Provider on call.  A Behavioral Health Provider will be available on call 24/7 and during holidays.    Primary Care Provider: Alice Robert MD    No Known Allergies    Reason for Admission: 19 y.o. female who presented to [facility] with PMH of ADHD, depression functional neurological symptom disorder and psychogenic syncope.  Pt was seen and evaluated after possible syncopal episode on [facility] today.  Pt wrote in there notebook prior to this episode that \" I feel really not good right now. I feel like I could pass out my vision is getting blurrier and blurrier. Its getting progressively harder to look up things are moving weird. My throat feels cold my head hurts I feel really weak and tired. I feel sick.\"  Staff found her on the floor with legs up on the bed, pale and diaphoretic.  Pt stated that she fell, she thinks she hit her head.  When asking patient what happened she whispered she wrote it in her journal which is as above.  She denied any pain or discomfort from fall, no headache, neck pain, or extremity pain.  She was noted to be orthostatic from lying to sitting position.  She was taken to CT and transferred to the medical floor for further work-up.     Admission Diagnosis: MDD (major depressive disorder) [F32.9]  Major depression, recurrent (HCC) [F33.9]    * No surgery found *    Results for orders placed or performed during the hospital encounter of 07/12/24   Lipid Panel   Result Value Ref Range    Cholesterol, Total 142 0 - 199 mg/dL    Triglycerides 99 0 - 150 mg/dL  tobacco user  and needs referral for tobacco cessation? No  Patient referred to the following for tobacco cessation with an appointment? No  Patient was offered medication to assist with tobacco cessation at discharge? No    Alcohol/Substance Abuse Discharge Plan:   Does the patient have a history of substance/alcohol abuse and requires a referral for treatment? No  Patient referred to the following for substance/alcohol abuse treatment with an appointment? No  Patient was offered medication to assist with substance/alcohol abuse cessation at discharge? No      Patient discharged to: Home; Transition record discussed with patient/caregiver and provided this record in hard copy or electronically     Discharge Completed By: Ida Dunne, MSW, LSW

## 2024-07-16 NOTE — PLAN OF CARE
Problem: Self Harm/Suicidality  Goal: Will have no self-injury during hospital stay  Description: INTERVENTIONS:  1.  Ensure constant observer at bedside with Q15M safety checks  2.  Maintain a safe environment  3.  Secure patient belongings  4.  Ensure family/visitors adhere to safety recommendations  5.  Ensure safety tray has been added to patient's diet order  6.  Every shift and PRN: Re-assess suicidal risk via Frequent Screener    7/16/2024 0844 by Jessica Larson RN  Outcome: Progressing  7/15/2024 2307 by Anna Vazquez RN  Outcome: Progressing     Problem: Anxiety  Goal: Will report anxiety at manageable levels  Description: INTERVENTIONS:  1. Administer medication as ordered  2. Teach and rehearse alternative coping skills  3. Provide emotional support with 1:1 interaction with staff  7/16/2024 0844 by Jessica Larson RN  Outcome: Progressing  Flowsheets (Taken 7/16/2024 0842)  Will report anxiety at manageable levels:   Provide emotional support with 1:1 interaction with staff   Teach and rehearse alternative coping skills  7/15/2024 2307 by Anna Vazquez RN  Outcome: Progressing     Problem: Depression  Goal: Will be euthymic at discharge  Description: INTERVENTIONS:  1. Administer medication as ordered  2. Provide emotional support via 1:1 interaction with staff  3. Encourage involvement in milieu/groups/activities  4. Monitor for social isolation  7/16/2024 0844 by Jessica Larson RN  Outcome: Progressing  7/15/2024 2307 by Anna Vazquez RN  Outcome: Progressing

## 2024-07-16 NOTE — PROGRESS NOTES
Shift assessment complete, scheduled meds given. Prn ibuprofen per pt request for her headache. Pt denies all. Alisson could be seen in the day room this evening until bed time. She could be seen playing card games and socializing with peers, interacting and very brightened and animated with her peers. Shower supplies given per pt request. Pt does not express any further needs at this time.

## 2024-07-16 NOTE — GROUP NOTE
Group Therapy Note    Date: 7/16/2024    Group Start Time: 1000  Group End Time: 1045  Group Topic: Cognitive Skills    [facility]    Phuong Joyner LPC    Group members engaged in psychoeducation on communication styles/skills. LPC shared information on passive, aggressive, passive-aggressive and assertive communication. Group members shared personal connections and explored ideas on how to move closer to using assertive communication. Group members exploring using \"Dear Man\" (DBT acronym) to express needs/wants respectfully.     Group Therapy Note    Attendees: 5       Notes:  Alisson engaged appropriately while exploring communication styles and ways to improve communication. Alisson offered appropriate feedback and showed appropriate insight.     Status After Intervention:  Improved    Participation Level: Active Listener and Interactive    Participation Quality: Appropriate, Attentive, Sharing, and Supportive      Speech:  normal      Thought Process/Content: Logical      Affective Functioning: Congruent      Mood: euthymic      Level of consciousness:  Alert      Response to Learning: Able to verbalize current knowledge/experience, Able to verbalize/acknowledge new learning, Able to retain information, and Capable of insight      Endings: None Reported    Modes of Intervention: Education, Support, Socialization, and Exploration      Discipline Responsible: /Counselor      Signature:  Phuong Joyner LPC

## 2024-07-16 NOTE — PROGRESS NOTES
Bridge Appointment completed: Reviewed Discharge Instructions with patient.    Patient verbalizes understanding and agreement with the discharge plan using the teachback method.     Referral for Outpatient Tobacco Cessation Counseling, upon discharge (yury X if applicable and completed):    ( )  Hospital staff assisted patient to call Quit Line or faxed referral                                   during hospitalization                  ( )  Recognizing danger situations (included triggers and roadblocks), if not completed on admission                    ( )  Coping skills (new ways to manage stress, exercise, relaxation techniques, changing routine, distraction), if not completed on admission                                                           ( )  Basic information about quitting (benefits of quitting, techniques in how to quit, available resources, if not completed on admission  ( ) [facility]   ( X) Patient refused referral  ( X) Patient refused counseling  ( X) Patient refused smoking cessation medication upon discharge    Vaccinations (yury X if applicable and completed):  ( ) Patient states already received influenza vaccine elsewhere  ( ) Patient received influenza vaccine during this hospitalization  ( ) Patient refused influenza vaccine at this time  ( X) Not offered

## 2024-07-16 NOTE — GROUP NOTE
Group Therapy Note    Date: 7/16/2024    Group Start Time: 1100  Group End Time: 1145  Group Topic: Relapse Prevention    [facility]    Mary Kate Ghosh, RT        Group Therapy Note    Attendees: 6    Group topic was centered on relapse prevention and recovery.  Group processed how to help prevent a mental health crisis and the importance of having a safety plan to utilize as needed.  Participants were given time to work on their safety plans and ask any questions they had.  Group discussed support systems, coping strategies, safe places, and healthy outlets.      Notes:  Pt was present and engaged across session.  Participated in activity and was able to complete safety plan.  Shared during group discussion and met goal for group.    Status After Intervention:  Improved    Participation Level: Active Listener and Interactive    Participation Quality: Appropriate, Attentive, Sharing, and Supportive      Speech:  normal      Thought Process/Content: Logical      Affective Functioning: Congruent      Mood: euthymic      Level of consciousness:  Alert, Oriented x4, and Attentive      Response to Learning: Able to verbalize current knowledge/experience, Able to verbalize/acknowledge new learning, Able to retain information, Capable of insight, and Progressing to goal      Endings: None Reported    Modes of Intervention: Education, Support, Socialization, Exploration, Clarifying, Problem-solving, and Activity      Discipline Responsible: Psychoeducational Specialist and Recreational Therapist      Signature:  Mary Kate Ghosh MA, CTRS

## 2024-07-16 NOTE — PLAN OF CARE
Problem: Self Harm/Suicidality  Goal: Will have no self-injury during hospital stay  Description: INTERVENTIONS:  1.  Ensure constant observer at bedside with Q15M safety checks  2.  Maintain a safe environment  3.  Secure patient belongings  4.  Ensure family/visitors adhere to safety recommendations  5.  Ensure safety tray has been added to patient's diet order  6.  Every shift and PRN: Re-assess suicidal risk via Frequent Screener    Outcome: Progressing     Problem: Anxiety  Goal: Will report anxiety at manageable levels  Description: INTERVENTIONS:  1. Administer medication as ordered  2. Teach and rehearse alternative coping skills  3. Provide emotional support with 1:1 interaction with staff  Outcome: Progressing     Problem: Depression  Goal: Will be euthymic at discharge  Description: INTERVENTIONS:  1. Administer medication as ordered  2. Provide emotional support via 1:1 interaction with staff  3. Encourage involvement in milieu/groups/activities  4. Monitor for social isolation  Outcome: Progressing     Problem: Behavior  Goal: Pt/Family maintain appropriate behavior and adhere to behavioral management agreement, if implemented  Description: INTERVENTIONS:  1. Assess patient/family's coping skills and  non-compliant behavior (including use of illegal substances)  2. Notify security of behavior or suspected illegal substances which indicate the need for search of the family and/or belongings  3. Encourage verbalization of thoughts and concerns in a socially appropriate manner  4. Utilize positive, consistent limit setting strategies supporting safety of patient, staff and others  5. Encourage participation in the decision making process about the behavioral management agreement  6. If a visitor's behavior poses a threat to safety call refer to organization policy.  7. Initiate consult with , Psychosocial CNS, Spiritual Care as appropriate  Outcome: Progressing       Alisson sosa

## 2024-07-16 NOTE — CONSULTS
Department of Psychiatry  Progress Note    Patient's chart was reviewed. Met with patient.     SUBJECTIVE:      \"I can feel emotions again. It's been so long.\"    Improved affect and EC.    Dreads returning to he house where she lives with her brother. He doesn't contribute financially. Her parents own the house and the only way they will let her live there is if she lives with her brother. Also, she doesn't like her coworkers.     Active in the milieu and programming.  Journaling.     Has not had thoughts of self-harm or suicide.     ROS:   Patient has new complaints: no  Sleeping adequately:  Yes   Appetite adequate: Yes    OBJECTIVE:  VITALS:  BP 98/63   Pulse (!) 102   Temp 98.1 °F (36.7 °C) (Oral)   Resp 16   Ht 1.6 m (5' 3\")   Wt 51.7 kg (114 lb)   LMP 07/01/2024 Comment: ended 7/8  SpO2 97%   BMI 20.19 kg/m²     Mental Status Examination:    Appearance: fair grooming and hygiene  Behavior/Attitude toward examiner:  pleasant  Speech: non-pressured  Mood:  \"better\"  Affect:  improving  Thought processes:  Goal directed, linear, no MARISABEL or gross disorganization  Thought Content: no SI, no HI, no delusions voiced, no obsessions  Perceptions: no AVH  Attention: attention span and concentration were intact to interview   Abstraction: intact  Cognition:  Alert and oriented to person, place, time, and situation, recall intact  Insight: fair  Judgment: fair    Medication:  Scheduled:   traZODone  50 mg Oral Nightly    escitalopram  10 mg Oral Daily        PRN:  acetaminophen, ibuprofen, magnesium hydroxide, nicotine polacrilex, aluminum & magnesium hydroxide-simethicone, OLANZapine **OR** OLANZapine (ZyPREXA) 10 mg in sterile water 2 mL injection, benztropine mesylate, traZODone, hydrOXYzine HCl, hydrOXYzine HCl, ondansetron     FORMULATION: This is a domiciled, never , and employed 19-year-old with history of depression and ADD, whose friend brought her to [facility] with worsening

## 2024-07-17 ENCOUNTER — FOLLOWUP TELEPHONE ENCOUNTER (OUTPATIENT)
Dept: PSYCHIATRY | Age: 19
End: 2024-07-17

## 2024-07-22 ENCOUNTER — FOLLOWUP TELEPHONE ENCOUNTER (OUTPATIENT)
Dept: PSYCHIATRY | Age: 19
End: 2024-07-22